# Patient Record
Sex: MALE | Race: WHITE | Employment: UNEMPLOYED | URBAN - METROPOLITAN AREA
[De-identification: names, ages, dates, MRNs, and addresses within clinical notes are randomized per-mention and may not be internally consistent; named-entity substitution may affect disease eponyms.]

---

## 2021-01-02 ENCOUNTER — HOSPITAL ENCOUNTER (INPATIENT)
Facility: CLINIC | Age: 61
LOS: 10 days | Discharge: NURSING, REHAB OR LONG-TERM CARE | End: 2021-01-12
Attending: INTERNAL MEDICINE | Admitting: INTERNAL MEDICINE
Payer: MEDICAID

## 2021-01-02 DIAGNOSIS — M79.10 MUSCLE PAIN: ICD-10-CM

## 2021-01-02 DIAGNOSIS — H81.10 BENIGN PAROXYSMAL POSITIONAL VERTIGO, UNSPECIFIED LATERALITY: ICD-10-CM

## 2021-01-02 DIAGNOSIS — I50.23 HEART FAILURE, SYSTOLIC, ACUTE ON CHRONIC (H): Primary | ICD-10-CM

## 2021-01-02 LAB
ALBUMIN SERPL-MCNC: 2.4 G/DL (ref 3.4–5)
ALP SERPL-CCNC: 116 U/L (ref 40–150)
ALT SERPL W P-5'-P-CCNC: 106 U/L (ref 0–70)
ANION GAP SERPL CALCULATED.3IONS-SCNC: 6 MMOL/L (ref 3–14)
APTT PPP: 29 SEC (ref 22–37)
AST SERPL W P-5'-P-CCNC: 51 U/L (ref 0–45)
BASOPHILS # BLD AUTO: 0 10E9/L (ref 0–0.2)
BASOPHILS NFR BLD AUTO: 0.3 %
BILIRUB SERPL-MCNC: 0.8 MG/DL (ref 0.2–1.3)
BUN SERPL-MCNC: 36 MG/DL (ref 7–30)
CALCIUM SERPL-MCNC: 8.5 MG/DL (ref 8.5–10.1)
CHLORIDE SERPL-SCNC: 101 MMOL/L (ref 94–109)
CK SERPL-CCNC: 144 U/L (ref 30–300)
CO2 SERPL-SCNC: 31 MMOL/L (ref 20–32)
CREAT SERPL-MCNC: 1.47 MG/DL (ref 0.66–1.25)
DIFFERENTIAL METHOD BLD: ABNORMAL
EOSINOPHIL # BLD AUTO: 0.1 10E9/L (ref 0–0.7)
EOSINOPHIL NFR BLD AUTO: 0.6 %
ERYTHROCYTE [DISTWIDTH] IN BLOOD BY AUTOMATED COUNT: 13.8 % (ref 10–15)
GFR SERPL CREATININE-BSD FRML MDRD: 51 ML/MIN/{1.73_M2}
GLUCOSE BLDC GLUCOMTR-MCNC: 148 MG/DL (ref 70–99)
GLUCOSE SERPL-MCNC: 149 MG/DL (ref 70–99)
HCT VFR BLD AUTO: 36.1 % (ref 40–53)
HGB BLD-MCNC: 11.7 G/DL (ref 13.3–17.7)
IMM GRANULOCYTES # BLD: 0.2 10E9/L (ref 0–0.4)
IMM GRANULOCYTES NFR BLD: 1.5 %
INR PPP: 1.2 (ref 0.86–1.14)
LACTATE BLD-SCNC: 1.2 MMOL/L (ref 0.7–2)
LYMPHOCYTES # BLD AUTO: 0.8 10E9/L (ref 0.8–5.3)
LYMPHOCYTES NFR BLD AUTO: 5.9 %
MCH RBC QN AUTO: 31.7 PG (ref 26.5–33)
MCHC RBC AUTO-ENTMCNC: 32.4 G/DL (ref 31.5–36.5)
MCV RBC AUTO: 98 FL (ref 78–100)
MONOCYTES # BLD AUTO: 1.1 10E9/L (ref 0–1.3)
MONOCYTES NFR BLD AUTO: 7.6 %
NEUTROPHILS # BLD AUTO: 12.1 10E9/L (ref 1.6–8.3)
NEUTROPHILS NFR BLD AUTO: 84.1 %
NRBC # BLD AUTO: 0 10*3/UL
NRBC BLD AUTO-RTO: 0 /100
NT-PROBNP SERPL-MCNC: ABNORMAL PG/ML (ref 0–900)
PLATELET # BLD AUTO: 214 10E9/L (ref 150–450)
POTASSIUM SERPL-SCNC: 4.2 MMOL/L (ref 3.4–5.3)
PROT SERPL-MCNC: 6.1 G/DL (ref 6.8–8.8)
RBC # BLD AUTO: 3.69 10E12/L (ref 4.4–5.9)
SODIUM SERPL-SCNC: 138 MMOL/L (ref 133–144)
WBC # BLD AUTO: 14.4 10E9/L (ref 4–11)

## 2021-01-02 PROCEDURE — 93010 ELECTROCARDIOGRAM REPORT: CPT | Performed by: INTERNAL MEDICINE

## 2021-01-02 PROCEDURE — 83605 ASSAY OF LACTIC ACID: CPT | Performed by: STUDENT IN AN ORGANIZED HEALTH CARE EDUCATION/TRAINING PROGRAM

## 2021-01-02 PROCEDURE — 85610 PROTHROMBIN TIME: CPT | Performed by: STUDENT IN AN ORGANIZED HEALTH CARE EDUCATION/TRAINING PROGRAM

## 2021-01-02 PROCEDURE — 83735 ASSAY OF MAGNESIUM: CPT | Performed by: STUDENT IN AN ORGANIZED HEALTH CARE EDUCATION/TRAINING PROGRAM

## 2021-01-02 PROCEDURE — 93005 ELECTROCARDIOGRAM TRACING: CPT

## 2021-01-02 PROCEDURE — 85025 COMPLETE CBC W/AUTO DIFF WBC: CPT | Performed by: STUDENT IN AN ORGANIZED HEALTH CARE EDUCATION/TRAINING PROGRAM

## 2021-01-02 PROCEDURE — 85730 THROMBOPLASTIN TIME PARTIAL: CPT | Performed by: STUDENT IN AN ORGANIZED HEALTH CARE EDUCATION/TRAINING PROGRAM

## 2021-01-02 PROCEDURE — 83880 ASSAY OF NATRIURETIC PEPTIDE: CPT | Performed by: STUDENT IN AN ORGANIZED HEALTH CARE EDUCATION/TRAINING PROGRAM

## 2021-01-02 PROCEDURE — 80053 COMPREHEN METABOLIC PANEL: CPT | Performed by: STUDENT IN AN ORGANIZED HEALTH CARE EDUCATION/TRAINING PROGRAM

## 2021-01-02 PROCEDURE — 36415 COLL VENOUS BLD VENIPUNCTURE: CPT | Performed by: STUDENT IN AN ORGANIZED HEALTH CARE EDUCATION/TRAINING PROGRAM

## 2021-01-02 PROCEDURE — 999N001017 HC STATISTIC GLUCOSE BY METER IP

## 2021-01-02 PROCEDURE — 214N000001 HC R&B CCU UMMC

## 2021-01-02 PROCEDURE — 84145 PROCALCITONIN (PCT): CPT | Performed by: STUDENT IN AN ORGANIZED HEALTH CARE EDUCATION/TRAINING PROGRAM

## 2021-01-02 PROCEDURE — 82550 ASSAY OF CK (CPK): CPT | Performed by: STUDENT IN AN ORGANIZED HEALTH CARE EDUCATION/TRAINING PROGRAM

## 2021-01-02 RX ORDER — FUROSEMIDE 10 MG/ML
40 INJECTION INTRAMUSCULAR; INTRAVENOUS ONCE
Status: COMPLETED | OUTPATIENT
Start: 2021-01-02 | End: 2021-01-03

## 2021-01-02 RX ORDER — POLYETHYLENE GLYCOL 3350 17 G/17G
17 POWDER, FOR SOLUTION ORAL DAILY
Status: DISCONTINUED | OUTPATIENT
Start: 2021-01-03 | End: 2021-01-12 | Stop reason: HOSPADM

## 2021-01-02 RX ORDER — ACETAMINOPHEN 650 MG/1
650 SUPPOSITORY RECTAL EVERY 4 HOURS PRN
Status: DISCONTINUED | OUTPATIENT
Start: 2021-01-02 | End: 2021-01-12 | Stop reason: HOSPADM

## 2021-01-02 RX ORDER — ACETAMINOPHEN 325 MG/1
650 TABLET ORAL EVERY 4 HOURS PRN
Status: DISCONTINUED | OUTPATIENT
Start: 2021-01-02 | End: 2021-01-12 | Stop reason: HOSPADM

## 2021-01-02 RX ORDER — LANSOPRAZOLE 30 MG/1
30 TABLET, ORALLY DISINTEGRATING, DELAYED RELEASE ORAL 2 TIMES DAILY
Status: DISCONTINUED | OUTPATIENT
Start: 2021-01-03 | End: 2021-01-12 | Stop reason: HOSPADM

## 2021-01-02 RX ORDER — DEXTROSE MONOHYDRATE 25 G/50ML
25-50 INJECTION, SOLUTION INTRAVENOUS
Status: DISCONTINUED | OUTPATIENT
Start: 2021-01-02 | End: 2021-01-12 | Stop reason: HOSPADM

## 2021-01-02 RX ORDER — ATORVASTATIN CALCIUM 40 MG/1
40 TABLET, FILM COATED ORAL EVERY EVENING
Status: DISCONTINUED | OUTPATIENT
Start: 2021-01-03 | End: 2021-01-03

## 2021-01-02 RX ORDER — AMOXICILLIN 250 MG
1 CAPSULE ORAL 2 TIMES DAILY
Status: DISCONTINUED | OUTPATIENT
Start: 2021-01-02 | End: 2021-01-12 | Stop reason: HOSPADM

## 2021-01-02 RX ORDER — AMOXICILLIN 250 MG
2 CAPSULE ORAL 2 TIMES DAILY
Status: DISCONTINUED | OUTPATIENT
Start: 2021-01-02 | End: 2021-01-12 | Stop reason: HOSPADM

## 2021-01-02 RX ORDER — NICOTINE POLACRILEX 4 MG
15-30 LOZENGE BUCCAL
Status: DISCONTINUED | OUTPATIENT
Start: 2021-01-02 | End: 2021-01-12 | Stop reason: HOSPADM

## 2021-01-02 RX ORDER — ASPIRIN 81 MG/1
324 TABLET, CHEWABLE ORAL ONCE
Status: DISCONTINUED | OUTPATIENT
Start: 2021-01-02 | End: 2021-01-02

## 2021-01-02 RX ORDER — FINASTERIDE 5 MG/1
5 TABLET, FILM COATED ORAL DAILY
Status: DISCONTINUED | OUTPATIENT
Start: 2021-01-03 | End: 2021-01-02

## 2021-01-02 RX ORDER — LIDOCAINE 40 MG/G
CREAM TOPICAL
Status: DISCONTINUED | OUTPATIENT
Start: 2021-01-02 | End: 2021-01-12 | Stop reason: HOSPADM

## 2021-01-02 RX ORDER — ONDANSETRON 2 MG/ML
4 INJECTION INTRAMUSCULAR; INTRAVENOUS EVERY 6 HOURS PRN
Status: DISCONTINUED | OUTPATIENT
Start: 2021-01-02 | End: 2021-01-12 | Stop reason: HOSPADM

## 2021-01-02 RX ORDER — ASPIRIN 81 MG/1
81 TABLET, CHEWABLE ORAL DAILY
Status: DISCONTINUED | OUTPATIENT
Start: 2021-01-03 | End: 2021-01-12 | Stop reason: HOSPADM

## 2021-01-02 RX ORDER — ONDANSETRON 4 MG/1
4 TABLET, ORALLY DISINTEGRATING ORAL EVERY 6 HOURS PRN
Status: DISCONTINUED | OUTPATIENT
Start: 2021-01-02 | End: 2021-01-12 | Stop reason: HOSPADM

## 2021-01-02 ASSESSMENT — ACTIVITIES OF DAILY LIVING (ADL)
NUMBER_OF_TIMES_PATIENT_HAS_FALLEN_WITHIN_LAST_SIX_MONTHS: 2
WALKING_OR_CLIMBING_STAIRS_DIFFICULTY: YES
DIFFICULTY_COMMUNICATING: NO
DOING_ERRANDS_INDEPENDENTLY_DIFFICULTY: NO
TOILETING_ISSUES: YES
WALKING_OR_CLIMBING_STAIRS: AMBULATION DIFFICULTY, REQUIRES EQUIPMENT
FALL_HISTORY_WITHIN_LAST_SIX_MONTHS: YES
DIFFICULTY_EATING/SWALLOWING: NO
TOILETING_ASSISTANCE: TOILETING DIFFICULTY, REQUIRES EQUIPMENT
WHICH_OF_THE_ABOVE_FUNCTIONAL_RISKS_HAD_A_RECENT_ONSET_OR_CHANGE?: AMBULATION;TRANSFERRING;TOILETING;BATHING;DRESSING;FALL HISTORY
CONCENTRATING,_REMEMBERING_OR_MAKING_DECISIONS_DIFFICULTY: NO
EQUIPMENT_CURRENTLY_USED_AT_HOME: WALKER, ROLLING
DRESSING/BATHING_DIFFICULTY: YES

## 2021-01-02 NOTE — LETTER
Health Information Management Services               Recipient:  [facility]        Sender:  Lucy Galeana, [facility] [phone]        Date: January 12, 2021  Patient Name:  Willy Harrell  Routing Message:    Discharge Orders        The documents accompanying this notice contain confidential information belonging to the sender.  This information is intended only for the use of the individual or entity named above.  The authorized recipient of this information is prohibited from disclosing this information to any other party and is required to destroy the information after its stated need has been fulfilled, unless otherwise required by state law.      If you are not the intended recipient, you are hereby notified that any disclosure, copy, distribution or action taken in reliance on the contents of these documents is strictly prohibited.  If you have received this document in error, please return it by fax to [phone] with a note on the cover sheet explaining why you are returning it (e.g. not your patient, not your provider, etc.).  If you need further assistance, please call [facility] Centralized Transcription at [phone].  Documents may also be returned by mail to "[employer]", , [facility] [address], [place], [place] [address].

## 2021-01-02 NOTE — LETTER
Health Information Management Services               Recipient:    Admissions      Sender:    Rupa VALENCIA [facility] [phone]      Date: January 8, 2021  Patient Name:  Willy Harrell  Routing Message:  Referral for TCU. Thank you!          The documents accompanying this notice contain confidential information belonging to the sender.  This information is intended only for the use of the individual or entity named above.  The authorized recipient of this information is prohibited from disclosing this information to any other party and is required to destroy the information after its stated need has been fulfilled, unless otherwise required by state law.      If you are not the intended recipient, you are hereby notified that any disclosure, copy, distribution or action taken in reliance on the contents of these documents is strictly prohibited.  If you have received this document in error, please return it by fax to [phone] with a note on the cover sheet explaining why you are returning it (e.g. not your patient, not your provider, etc.).  If you need further assistance, please call [facility] Centralized Transcription at [phone].  Documents may also be returned by mail to [employer], , [address], [place], [place] [address].

## 2021-01-02 NOTE — LETTER
Health Information Management Services               Recipient:  Short term rehab admissions        Sender:  ANNIE Pittman, LICSW  6C   [facility]  Phone [phone]  RN Station Ph [phone]        Date: January 7, 2021  Patient Name:  Willy Harrell  Routing Message:    Please consider for rehab placement, likely ready for d/c Mon. Thanks!        The documents accompanying this notice contain confidential information belonging to the sender.  This information is intended only for the use of the individual or entity named above.  The authorized recipient of this information is prohibited from disclosing this information to any other party and is required to destroy the information after its stated need has been fulfilled, unless otherwise required by state law.      If you are not the intended recipient, you are hereby notified that any disclosure, copy, distribution or action taken in reliance on the contents of these documents is strictly prohibited.  If you have received this document in error, please return it by fax to [phone] with a note on the cover sheet explaining why you are returning it (e.g. not your patient, not your provider, etc.).  If you need further assistance, please call [facility] Centralized Transcription at [phone].  Documents may also be returned by mail to [employer], , [facility] [address], [place], [place] [address].

## 2021-01-02 NOTE — LETTER
Health Information Management Services               Recipient:    Admissions       Sender:    Rupa VALENCIA [facility] [phone]      Date: January 8, 2021  Patient Name:  Willy Harrell  Routing Message:  Referral for TCU, thank you!          The documents accompanying this notice contain confidential information belonging to the sender.  This information is intended only for the use of the individual or entity named above.  The authorized recipient of this information is prohibited from disclosing this information to any other party and is required to destroy the information after its stated need has been fulfilled, unless otherwise required by state law.      If you are not the intended recipient, you are hereby notified that any disclosure, copy, distribution or action taken in reliance on the contents of these documents is strictly prohibited.  If you have received this document in error, please return it by fax to [phone] with a note on the cover sheet explaining why you are returning it (e.g. not your patient, not your provider, etc.).  If you need further assistance, please call [facility] [place] Centralized Transcription at [phone].  Documents may also be returned by mail to [employer], , [address], [place], [place] [address].

## 2021-01-02 NOTE — LETTER
Transition Communication Hand-off for Care Transitions to Next Level of Care Provider    Name: Willy Harrell  : 1960  MRN #: [id number]  Primary Care Provider: Anita Gusman     Primary Clinic: [facility] [address]  [facility] [address]     Reason for Hospitalization:  post-STEMI, systolic heart failure  Heart failure, systolic, acute on chronic (H)  Admit Date/Time: 2021  8:58 PM  Discharge Date: 21  Payor Source: Payor: MEDICAID [place] / Plan: [facility] [place] PLAN [place] PMAP / Product Type: *No Product type* /          Reason for Communication Hand-off Referral: Other Admitting to TCU    Discharge Plan:    Care Management Discharge Note    Discharge Date: 21  Expected Time of Departure: 1500 via [employer] (Ph: [phone]) - stretcher d/t O2 and weakness/deconditioning    Discharge Disposition: Transitional Care: [facility]  [address] [place] [address]  Ph: [phone], F: [phone]    Private pay costs discussed: transportation costs    PAS Confirmation Code:  N/A - Not needed for [place] TCU  Patient/family educated on Medicare website which has current facility and service quality ratings: yes    Education Provided on the Discharge Plan:  yes  Persons Notified of Discharge Plans: Patient, Pt's wife (Jaky), Cards 2 Team, [facility], 6C Nursing Staff  Patient/Family in Agreement with the Plan: yes     Concern for non-adherence with plan of care:   Y/N N  Discharge Needs Assessment:  Needs      Most Recent Value   Anticipated Changes Related to Illness  none   Equipment Currently Used at Home  walker, rolling [readily available but does not currently use ]   # of Referrals Placed by CTS  Post Acute Facilities          Already enrolled in Tele-monitoring program and name of program:  N/A  Follow-up specialty is recommended: Yes    Follow-up plan:    Future Appointments   Date Time Provider Department Center   2021   7:00 [facility]       Any outstanding tests or procedures:        Referrals     Future Labs/Procedures    Occupational Therapy Adult Consult     Comments:    Evaluate and treat as clinically indicated.    Reason:  Deconditioning, prolonged hospitalization    Physical Therapy Adult Consult     Comments:    Evaluate and treat as clinically indicated.    Reason:  Deconditioning prolonged hospitalization           ANNIE Chase, LICSW  6B Intermediate Care Unit   [facility]  Phone: [phone]  Pager: [phone]

## 2021-01-03 ENCOUNTER — APPOINTMENT (OUTPATIENT)
Dept: GENERAL RADIOLOGY | Facility: CLINIC | Age: 61
End: 2021-01-03
Attending: INTERNAL MEDICINE
Payer: MEDICAID

## 2021-01-03 ENCOUNTER — APPOINTMENT (OUTPATIENT)
Dept: CT IMAGING | Facility: CLINIC | Age: 61
End: 2021-01-03
Attending: INTERNAL MEDICINE
Payer: MEDICAID

## 2021-01-03 ENCOUNTER — APPOINTMENT (OUTPATIENT)
Dept: CARDIOLOGY | Facility: CLINIC | Age: 61
End: 2021-01-03
Attending: INTERNAL MEDICINE
Payer: MEDICAID

## 2021-01-03 ENCOUNTER — APPOINTMENT (OUTPATIENT)
Dept: ULTRASOUND IMAGING | Facility: CLINIC | Age: 61
End: 2021-01-03
Attending: INTERNAL MEDICINE
Payer: MEDICAID

## 2021-01-03 LAB
ALBUMIN SERPL-MCNC: 2.4 G/DL (ref 3.4–5)
ALP SERPL-CCNC: 115 U/L (ref 40–150)
ALT SERPL W P-5'-P-CCNC: 92 U/L (ref 0–70)
ANION GAP SERPL CALCULATED.3IONS-SCNC: 6 MMOL/L (ref 3–14)
ANION GAP SERPL CALCULATED.3IONS-SCNC: 7 MMOL/L (ref 3–14)
APTT PPP: 26 SEC (ref 22–37)
AST SERPL W P-5'-P-CCNC: 51 U/L (ref 0–45)
BILIRUB DIRECT SERPL-MCNC: 0.2 MG/DL (ref 0–0.2)
BILIRUB SERPL-MCNC: 0.8 MG/DL (ref 0.2–1.3)
BUN SERPL-MCNC: 35 MG/DL (ref 7–30)
BUN SERPL-MCNC: 37 MG/DL (ref 7–30)
CALCIUM SERPL-MCNC: 8.7 MG/DL (ref 8.5–10.1)
CALCIUM SERPL-MCNC: 8.9 MG/DL (ref 8.5–10.1)
CHLORIDE SERPL-SCNC: 102 MMOL/L (ref 94–109)
CHLORIDE SERPL-SCNC: 103 MMOL/L (ref 94–109)
CO2 SERPL-SCNC: 29 MMOL/L (ref 20–32)
CO2 SERPL-SCNC: 32 MMOL/L (ref 20–32)
CREAT SERPL-MCNC: 1.52 MG/DL (ref 0.66–1.25)
CREAT SERPL-MCNC: 1.55 MG/DL (ref 0.66–1.25)
ERYTHROCYTE [DISTWIDTH] IN BLOOD BY AUTOMATED COUNT: 13.9 % (ref 10–15)
GFR SERPL CREATININE-BSD FRML MDRD: 48 ML/MIN/{1.73_M2}
GFR SERPL CREATININE-BSD FRML MDRD: 49 ML/MIN/{1.73_M2}
GLUCOSE BLDC GLUCOMTR-MCNC: 145 MG/DL (ref 70–99)
GLUCOSE BLDC GLUCOMTR-MCNC: 166 MG/DL (ref 70–99)
GLUCOSE SERPL-MCNC: 145 MG/DL (ref 70–99)
GLUCOSE SERPL-MCNC: 156 MG/DL (ref 70–99)
HBA1C MFR BLD: 6.1 % (ref 0–5.6)
HCT VFR BLD AUTO: 35.3 % (ref 40–53)
HGB BLD-MCNC: 11.5 G/DL (ref 13.3–17.7)
LABORATORY COMMENT REPORT: NORMAL
MAGNESIUM SERPL-MCNC: 2.3 MG/DL (ref 1.6–2.3)
MCH RBC QN AUTO: 32 PG (ref 26.5–33)
MCHC RBC AUTO-ENTMCNC: 32.6 G/DL (ref 31.5–36.5)
MCV RBC AUTO: 98 FL (ref 78–100)
PLATELET # BLD AUTO: 201 10E9/L (ref 150–450)
POTASSIUM SERPL-SCNC: 4.1 MMOL/L (ref 3.4–5.3)
POTASSIUM SERPL-SCNC: 4.1 MMOL/L (ref 3.4–5.3)
PROCALCITONIN SERPL-MCNC: 0.05 NG/ML
PROT SERPL-MCNC: 5.8 G/DL (ref 6.8–8.8)
RBC # BLD AUTO: 3.59 10E12/L (ref 4.4–5.9)
SARS-COV-2 RNA SPEC QL NAA+PROBE: NEGATIVE
SODIUM SERPL-SCNC: 139 MMOL/L (ref 133–144)
SODIUM SERPL-SCNC: 140 MMOL/L (ref 133–144)
SPECIMEN SOURCE: NORMAL
WBC # BLD AUTO: 14.3 10E9/L (ref 4–11)

## 2021-01-03 PROCEDURE — 999N000208 ECHOCARDIOGRAM LIMITED

## 2021-01-03 PROCEDURE — 250N000011 HC RX IP 250 OP 636: Performed by: INTERNAL MEDICINE

## 2021-01-03 PROCEDURE — 70450 CT HEAD/BRAIN W/O DYE: CPT

## 2021-01-03 PROCEDURE — 93010 ELECTROCARDIOGRAM REPORT: CPT | Performed by: INTERNAL MEDICINE

## 2021-01-03 PROCEDURE — U0003 INFECTIOUS AGENT DETECTION BY NUCLEIC ACID (DNA OR RNA); SEVERE ACUTE RESPIRATORY SYNDROME CORONAVIRUS 2 (SARS-COV-2) (CORONAVIRUS DISEASE [COVID-19]), AMPLIFIED PROBE TECHNIQUE, MAKING USE OF HIGH THROUGHPUT TECHNOLOGIES AS DESCRIBED BY CMS-2020-01-R: HCPCS | Performed by: STUDENT IN AN ORGANIZED HEALTH CARE EDUCATION/TRAINING PROGRAM

## 2021-01-03 PROCEDURE — U0005 INFEC AGEN DETEC AMPLI PROBE: HCPCS | Performed by: STUDENT IN AN ORGANIZED HEALTH CARE EDUCATION/TRAINING PROGRAM

## 2021-01-03 PROCEDURE — 250N000011 HC RX IP 250 OP 636: Performed by: STUDENT IN AN ORGANIZED HEALTH CARE EDUCATION/TRAINING PROGRAM

## 2021-01-03 PROCEDURE — 93308 TTE F-UP OR LMTD: CPT | Mod: 26 | Performed by: INTERNAL MEDICINE

## 2021-01-03 PROCEDURE — 999N001017 HC STATISTIC GLUCOSE BY METER IP

## 2021-01-03 PROCEDURE — 93925 LOWER EXTREMITY STUDY: CPT | Mod: 26 | Performed by: RADIOLOGY

## 2021-01-03 PROCEDURE — 93321 DOPPLER ECHO F-UP/LMTD STD: CPT | Mod: 26 | Performed by: INTERNAL MEDICINE

## 2021-01-03 PROCEDURE — 85027 COMPLETE CBC AUTOMATED: CPT | Performed by: STUDENT IN AN ORGANIZED HEALTH CARE EDUCATION/TRAINING PROGRAM

## 2021-01-03 PROCEDURE — 36415 COLL VENOUS BLD VENIPUNCTURE: CPT | Performed by: STUDENT IN AN ORGANIZED HEALTH CARE EDUCATION/TRAINING PROGRAM

## 2021-01-03 PROCEDURE — 93325 DOPPLER ECHO COLOR FLOW MAPG: CPT | Mod: 26 | Performed by: INTERNAL MEDICINE

## 2021-01-03 PROCEDURE — 80076 HEPATIC FUNCTION PANEL: CPT | Performed by: INTERNAL MEDICINE

## 2021-01-03 PROCEDURE — 255N000002 HC RX 255 OP 636: Performed by: INTERNAL MEDICINE

## 2021-01-03 PROCEDURE — 71045 X-RAY EXAM CHEST 1 VIEW: CPT | Mod: 26 | Performed by: RADIOLOGY

## 2021-01-03 PROCEDURE — 99221 1ST HOSP IP/OBS SF/LOW 40: CPT | Performed by: PSYCHIATRY & NEUROLOGY

## 2021-01-03 PROCEDURE — 85730 THROMBOPLASTIN TIME PARTIAL: CPT | Performed by: STUDENT IN AN ORGANIZED HEALTH CARE EDUCATION/TRAINING PROGRAM

## 2021-01-03 PROCEDURE — 93925 LOWER EXTREMITY STUDY: CPT

## 2021-01-03 PROCEDURE — 83036 HEMOGLOBIN GLYCOSYLATED A1C: CPT | Performed by: INTERNAL MEDICINE

## 2021-01-03 PROCEDURE — 93005 ELECTROCARDIOGRAM TRACING: CPT

## 2021-01-03 PROCEDURE — 36415 COLL VENOUS BLD VENIPUNCTURE: CPT | Performed by: INTERNAL MEDICINE

## 2021-01-03 PROCEDURE — 71045 X-RAY EXAM CHEST 1 VIEW: CPT

## 2021-01-03 PROCEDURE — 250N000013 HC RX MED GY IP 250 OP 250 PS 637: Performed by: STUDENT IN AN ORGANIZED HEALTH CARE EDUCATION/TRAINING PROGRAM

## 2021-01-03 PROCEDURE — 214N000001 HC R&B CCU UMMC

## 2021-01-03 PROCEDURE — 80048 BASIC METABOLIC PNL TOTAL CA: CPT | Performed by: INTERNAL MEDICINE

## 2021-01-03 PROCEDURE — 99223 1ST HOSP IP/OBS HIGH 75: CPT | Mod: 25 | Performed by: INTERNAL MEDICINE

## 2021-01-03 PROCEDURE — 70450 CT HEAD/BRAIN W/O DYE: CPT | Mod: 26 | Performed by: RADIOLOGY

## 2021-01-03 RX ORDER — FUROSEMIDE 10 MG/ML
40 INJECTION INTRAMUSCULAR; INTRAVENOUS 2 TIMES DAILY
Status: DISCONTINUED | OUTPATIENT
Start: 2021-01-03 | End: 2021-01-04

## 2021-01-03 RX ORDER — ASPIRIN 81 MG/1
81 TABLET, CHEWABLE ORAL DAILY
COMMUNITY

## 2021-01-03 RX ORDER — ISOSORBIDE DINITRATE 5 MG/1
5 TABLET ORAL 3 TIMES DAILY
Status: ON HOLD | COMMUNITY
End: 2021-01-12

## 2021-01-03 RX ORDER — FINASTERIDE 5 MG/1
5 TABLET, FILM COATED ORAL DAILY
COMMUNITY

## 2021-01-03 RX ORDER — LISINOPRIL 10 MG/1
5 TABLET ORAL DAILY
COMMUNITY

## 2021-01-03 RX ORDER — MECLIZINE HCL 12.5 MG 12.5 MG/1
12.5 TABLET ORAL 3 TIMES DAILY PRN
Status: DISCONTINUED | OUTPATIENT
Start: 2021-01-03 | End: 2021-01-12 | Stop reason: HOSPADM

## 2021-01-03 RX ORDER — HEPARIN SODIUM 10000 [USP'U]/100ML
0-5000 INJECTION, SOLUTION INTRAVENOUS CONTINUOUS
Status: DISCONTINUED | OUTPATIENT
Start: 2021-01-03 | End: 2021-01-03 | Stop reason: CLARIF

## 2021-01-03 RX ORDER — CITALOPRAM HYDROBROMIDE 20 MG/1
20 TABLET ORAL DAILY
COMMUNITY

## 2021-01-03 RX ORDER — ATORVASTATIN CALCIUM 40 MG/1
40 TABLET, FILM COATED ORAL EVERY EVENING
Status: DISCONTINUED | OUTPATIENT
Start: 2021-01-03 | End: 2021-01-12 | Stop reason: HOSPADM

## 2021-01-03 RX ORDER — METOPROLOL TARTRATE 25 MG/1
12.5 TABLET, FILM COATED ORAL 2 TIMES DAILY
Status: ON HOLD | COMMUNITY
End: 2021-01-12

## 2021-01-03 RX ORDER — FUROSEMIDE 10 MG/ML
60 INJECTION INTRAMUSCULAR; INTRAVENOUS EVERY 12 HOURS
Status: DISCONTINUED | OUTPATIENT
Start: 2021-01-03 | End: 2021-01-03

## 2021-01-03 RX ORDER — ATORVASTATIN CALCIUM 40 MG/1
40 TABLET, FILM COATED ORAL EVERY EVENING
Status: DISCONTINUED | OUTPATIENT
Start: 2021-01-04 | End: 2021-01-03

## 2021-01-03 RX ORDER — CITALOPRAM HYDROBROMIDE 20 MG/1
20 TABLET ORAL DAILY
Status: DISCONTINUED | OUTPATIENT
Start: 2021-01-03 | End: 2021-01-12 | Stop reason: HOSPADM

## 2021-01-03 RX ORDER — ATORVASTATIN CALCIUM 20 MG/1
40 TABLET, FILM COATED ORAL EVERY EVENING
COMMUNITY

## 2021-01-03 RX ORDER — LANSOPRAZOLE 30 MG/1
30 TABLET, ORALLY DISINTEGRATING, DELAYED RELEASE ORAL 2 TIMES DAILY
COMMUNITY

## 2021-01-03 RX ADMIN — ASPIRIN 81 MG CHEWABLE TABLET 81 MG: 81 TABLET CHEWABLE at 08:57

## 2021-01-03 RX ADMIN — TICAGRELOR 90 MG: 90 TABLET ORAL at 21:00

## 2021-01-03 RX ADMIN — FUROSEMIDE 40 MG: 10 INJECTION, SOLUTION INTRAVENOUS at 21:00

## 2021-01-03 RX ADMIN — TICAGRELOR 90 MG: 90 TABLET ORAL at 08:57

## 2021-01-03 RX ADMIN — ATORVASTATIN CALCIUM 40 MG: 40 TABLET, FILM COATED ORAL at 21:00

## 2021-01-03 RX ADMIN — LANSOPRAZOLE 30 MG: 30 TABLET, ORALLY DISINTEGRATING, DELAYED RELEASE ORAL at 21:00

## 2021-01-03 RX ADMIN — FUROSEMIDE 40 MG: 10 INJECTION, SOLUTION INTRAVENOUS at 00:24

## 2021-01-03 RX ADMIN — FUROSEMIDE 40 MG: 10 INJECTION, SOLUTION INTRAVENOUS at 10:02

## 2021-01-03 RX ADMIN — HUMAN ALBUMIN MICROSPHERES AND PERFLUTREN 6 ML: 10; .22 INJECTION, SOLUTION INTRAVENOUS at 10:59

## 2021-01-03 RX ADMIN — ONDANSETRON 4 MG: 2 INJECTION INTRAMUSCULAR; INTRAVENOUS at 17:53

## 2021-01-03 RX ADMIN — ACETAMINOPHEN 650 MG: 325 TABLET, FILM COATED ORAL at 00:31

## 2021-01-03 RX ADMIN — LANSOPRAZOLE 30 MG: 30 TABLET, ORALLY DISINTEGRATING, DELAYED RELEASE ORAL at 09:01

## 2021-01-03 RX ADMIN — CITALOPRAM HYDROBROMIDE 20 MG: 20 TABLET ORAL at 08:57

## 2021-01-03 ASSESSMENT — ACTIVITIES OF DAILY LIVING (ADL)
ADLS_ACUITY_SCORE: 18

## 2021-01-03 ASSESSMENT — MIFFLIN-ST. JEOR
SCORE: 1650.4
SCORE: 1654.94

## 2021-01-03 NOTE — PROGRESS NOTES
Vascular Update:    Please see the full consult note by the covering general surgery resident.  The patient is transferred here for cardiac reasons and has significant cardiac issues.  Vascular consulted because his right LE feels cold to touch.  He has no pain in either foot.  Dopplers audible in PT and DP bilaterally.  He denies claudication, no calf pain with ambulating and no rest pain, no pain at night in the forefoot.  He says his legs have always been cool to the touch.  This is not a new finding.  On exam the feet are mildly cool to the touch.  They are not cyanotic in appearance.  We ordered a bilateral arterial duplex.  This does not show any obvious stenosis.  He has triphasic flow down into the PT and AT on the right foot and biphasic flow down to the ankle on the left foot.  He has excellent inflow with great triphasic waveforms of both CFA, SFA, and profunda bilaterally.  Vascular will sign off.  No plans for any vascular intervention.  He is asymptomatic, he has flow to both feet and he is here for cardiac reasons.    Plan of care directed by Dr. Macdonald, the Attending Vascular Surgeon.    Please contact the vascular surgery team with any questions or concerns.    Ortiz Ledesma MD  Vascular Surgery Fellow

## 2021-01-03 NOTE — PHARMACY-ADMISSION MEDICATION HISTORY
Admission medication history interview status for the 1/2/2021 admission is complete. See Epic admission navigator for allergy information, pharmacy, prior to admission medications and immunization status.     Medication history interview sources: pharmacist med history note from [facility] (12/22), OSH MAR accessed via [employer]    Changes made to PTA medication list (reason)  Added: all medications listed  Deleted: n/a  Changed: n/a    Additional medication history information (including reliability of information, actions taken by pharmacist):   - medications added after review of inpatient MAR from [facility]  - per the pharmacist med history documented on 12/22, only medications prior to admission were Lipitor (20 mg, increased to 40 mg at OSH), lisinopril, and diphenhydramine prn  - patient also received PRN acetaminophen, benzonatate, furosemide IV pushes  (40 and 60 mg doses), SQH, insulin lispro, lorazepam prn, melatonin, Miralax, senna/docusate at OSH. I did not add these to his outpatient medication list    Prior to Admission medications    Medication Sig Last Dose Taking? Auth Provider   aspirin (ASA) 81 MG chewable tablet Take 81 mg by mouth daily 1/2/2021 at 0900 Yes Unknown, Entered By History   citalopram (CELEXA) 20 MG tablet Take 20 mg by mouth daily 1/2/2021 at 0900 Yes Unknown, Entered By History   finasteride (PROSCAR) 5 MG tablet Take 5 mg by mouth daily 1/2/2021 at 0900 Yes Unknown, Entered By History   isosorbide dinitrate (ISORDIL) 5 MG tablet Take 5 mg by mouth 3 times daily 1/1/2021 at 1700 Yes Unknown, Entered By History   LANsoprazole (PREVACID SOLUTAB) 30 MG ODT Take 30 mg by mouth 2 times daily 1/2/2021 at 2000 Yes Unknown, Entered By History   metoprolol tartrate (LOPRESSOR) 25 MG tablet Take 12.5 mg by mouth 2 times daily 1/2/2021 at 2000 Yes Unknown, Entered By History   ticagrelor (BRILINTA) 90 MG tablet Take 90 mg by mouth 2 times daily 1/2/2021 at 2000 Yes Unknown,  Entered By History   atorvastatin (LIPITOR) 20 MG tablet Take 40 mg by mouth every evening  1/2/2021 at 2000  Unknown, Entered By History   diphenhydrAMINE (BENADRYL) 25 MG tablet Take 25 mg by mouth every 6 hours as needed for itching   Unknown, Entered By History   lisinopril (ZESTRIL) 10 MG tablet Take 5 mg by mouth daily   Unknown, Entered By History     Medication history completed by: Ana Paula Llanos, PharmD

## 2021-01-03 NOTE — PLAN OF CARE
6C Cancel, pt heading to CT (transport in room).  Pt declines PT eval this PM, and indicates last few times he tried standing with EZ stand that he almost collapsed in the EZ stand - says he's too weak for it.  Pt does agree to PT eval and trial of moveo a different day.

## 2021-01-03 NOTE — PROGRESS NOTES
Admission~ 2100  Diagnosis:HF work up   Admitted from:[facility]   Via: Ambulance  Accompanied by: EMT  Belongings: No belongings here with pt  Admission Profile: done  Teaching: orientation to unit, call don't fall, use of console, meal times, visiting hours, when to call for the RN (angina/sob/dizziness, etc.), and enforced importance of safety   Access: Pt came with PIVx2 SK  Telemetry: Placed on patient  Height/Weight:  Unable to take pt's we at this time since he  Is not able to stand on scale. Awating a room with a ceiling lift to zero bed.   2RN full skin assessment was done with DU Benavidez.   Pt came with Zafar cath due to hx of retention.

## 2021-01-03 NOTE — PROGRESS NOTES
[facility]     Stroke Consult Note    Reason for Consult: Left lower extremity weakness, concern for infarcts see on MRI at OSH    Chief Complaint: Advance heart failure therapy evaluation    HPI  Willy Harrell is a 60 year old male with past medical history of type 2 diabetes mellitus, hypertension, and osteomyelitis who was originally presented to [facility] on 12/22/20 with cardiogenic shock and anterior STEMI but was transferred to [facility] for evaluation of advance heart failure therapy.  He has had a prolonged course complicated by shock, hypoxic respiratory failure, rhabdomyolysis, ZAIN and severe sepsis.  Neurology was consulted for left lower extremity weakness.    Reportedly neurology was consulted on 12/25 (unable to find the note).  The overall story is somewhat unclear.  There are notes that the patient's wife reported that the patient has a history of osteomyelitis in summer 2019 in his left lower extremity and he has had chronic weakness in this leg since then.  There was also some concern for psychogenic etiology per the notes, especially in the setting of his wife reporting longstanding failure to thrive and depression.  Per the primary team notes the patient was being considered for ischemic stroke but thought to be less likely with his preserved sensation, concern for diabetic plexopathy due to longstanding diabetes, and spinal cord ischemia was considered.  There was also concern for contributions from his rhabdomyolysis and also critical illness myoneuronopathy.    In terms of his dizziness and nausea, per the OSH chart this is his baseline and not an acute issue.    TPA Treatment   Not given due to established infarct on imaging and unclear or unfavorable risk-benefit profile for extended window thrombolysis beyond the conventional 4.5 hour time window.    Endovascular Treatment  Not initiated due to absence of proximal vessel  occlusion    Impression  Mr. Harrell is a 60 year old male with recent admission for cardiogenic shock complicated by septic shock, respiratory failure, rhabdomyolysis, and STEMI who was transferred for escalation of care by cardiology.  Neurology consulted for left lower extremity weakness.  Overall it is difficult to assess the etiology of his weakness as it seems to be bilateral lower extremity weakness on my exam but mainly in the hips and knees.  His arm strength appears preserved and no other focal findings noted on exam.  He has had extensive neural axis imaging at [facility] however despite asking imaging to be forwarded only parts of the MRI brain is visible and not all of the spine imaging.  Regardless his MRI brain was read as multifocal infarctions, some of which could be contributing based on the read however without being able to see the imaging myself it is difficult to assess.  He was also noted to have an abnormality at T4 as well.      Suspect his lower extremity weakness is multifactorial.  He certainly could have contribution from his infarctions however his preserved sensation and not consistent hemiparesis is atypical.  The bilateral leg weakness is also atypical for a cerebral infarct.  The T4 lesion is reportedly in the ventral aspect of the cord which can cause lower extremity weakness, and bowel/bladder dysfunction but would expect more of a flaccid paralysis with some sensory involvement and he is able to move his right leg better than his left, and distally strength is at least 3/5.  No hyperreflexia noted in his lower extremities either.  The proximal weakness could be consistent with a myopathy and he did have rhabdo and there is likely a component of critical illness myopathy.      1. Left lower extremity weakness   2. Multifocal infarctions  3. Spinal cord abnormality - reportedly T2 hyperintense signal at T4 level in ventral aspec    Stroke work up:  - MRI Brain x 2 completed at OSH  -  "TTE completed at OSH and CHRISTOPHE pending here  - LDL 38  - HgbA1c 6.2%    Additional neurologic work up:  - MRI of C, T, and L spine three times - results as per below in note  - CT head completed as well    Recommendations  From a stroke standpoint he is essentially optimized from a medication standpoint.  He is on dual antiplatelet and atorvastatin 40, additionally from a stroke standpoint would either reduce or stop his atorvastatin however will defer to cardiology as it may be necessary from their standpoint.  Based on his CT and the description of the strokes from the OSH there is no contraindication.  We will attempt to get the imaging from [facility] again if possible.  Would not obtain EMG at this point as it will likely not change present management and difficult to assess inpatient.  There was no enhancement noted on his spine imaging and they did not comment on nerve root enhancement making an inflammatory etiology less likely, additionally adding steroids would potentially worsen an underlying myopathy.      - CT Angiography Head and Neck to complete stroke work up  - PT/OT, stroke education  - We will attempt to get the imaging sent from [facility] again  - Will defer antiplatelet versus anticoagulation or a combination of to cardiology     Recommendations were discussed with primary team on 1/3/21.    Patient Follow-up    - in 4-6 weeks with local neurologist for further evaluation      Thank you for this consult. We will follow up the CTA but if unremarkable no additional work up indicated.  Please call [phone] with further questions.    Jaky Oliva MD   Neurocritical Care  To page me or covering stroke neurology team member, click here: [facility]   Choose \"On Call\" tab at top, then search dropdown box for \"Neurology Adult\", select location, press Enter, then look for stroke/neuro ICU/telestroke.    ______________________________________________________    Past Medical History   No past medical history on " file.  Past Surgical History   No past surgical history on file.  Medications   Home Meds  Prior to Admission medications    Not on File     Scheduled Meds    aspirin  81 mg Oral Daily     atorvastatin  40 mg Oral QPM     citalopram  20 mg Oral Daily     furosemide  40 mg Intravenous BID     insulin aspart  1-7 Units Subcutaneous TID AC     insulin aspart  1-5 Units Subcutaneous At Bedtime     LANsoprazole  30 mg Oral BID     polyethylene glycol  17 g Oral Daily     senna-docusate  1 tablet Oral BID    Or     senna-docusate  2 tablet Oral BID     sodium chloride (PF)  3 mL Intracatheter Q8H     ticagrelor  90 mg Oral BID     Infusion Meds    [Held by provider] heparin       - MEDICATION INSTRUCTIONS -       PRN Meds  acetaminophen, acetaminophen, glucose **OR** dextrose **OR** glucagon, lidocaine 4%, lidocaine (buffered or not buffered), meclizine, - MEDICATION INSTRUCTIONS -, ondansetron **OR** ondansetron, sodium chloride (PF)    Allergies   No Known Allergies  Family History   No family history on file.  Social History   Social History     Tobacco Use     Smoking status: Not on file   Substance Use Topics     Alcohol use: Not on file     Drug use: Not on file       Review of Systems   The 10 point Review of Systems is negative other than noted in the HPI or here.      PHYSICAL EXAMINATION  Temp:  [98.1  F (36.7  C)-98.3  F (36.8  C)] 98.1  F (36.7  C)  Pulse:  [78-86] 86  Resp:  [18-20] 20  BP: ()/(64-72) 102/64  Cuff Mean (mmHg):  [75-84] 84  SpO2:  [95 %-96 %] 95 %   General: in no apparent distress, just appears fatigued  HEENT: no evidence of trauma, normocephalic  Cardiac: regular rate and rhythm  Respiratory: tolerating room air, no use of accessory muscles, no audible wheezing  Extremities: no lower extremity edema, pain when checking for clonus at the left ankle    Mental status: awake and alert, speech is fluent, follows commands  Cranial nerves: PERRL, EOMI, face is symmetric  Motor: 5/5 in  bilateral upper extremities  - Left le/5 with dorsiflexion, 3-4/5 with plantarflexion, 1/5 at knee flexion/extension, 1/5 at hip flexion but no clear effort, reports that he can not abduct/adduct at the hip but he is seen spontaneously moving at the hip in the plane of the bed  - Right le/5 with dorsiflexion/plantarflexion, 4/5 at knee flexion, 2-3/5 with hip flexion  Sensory: intact to light touch in all four extremties  Coordination: no dysmetria noted in upper extremities  Reflexes: not hyperreflexic, seems hyporeflexic in bilateral lower extremities    Dysphagia Screen  Per Nursing    Stroke Scales    NIHSS  Interval baseline (21)   Interval Comments     1a. Level of Consciousness 0-->Alert, keenly responsive   1b. LOC Questions 0-->Answers both questions correctly   1c. LOC Commands 0-->Performs both tasks correctly   2.   Best Gaze 0-->Normal   3.   Visual 0-->No visual loss   4.   Facial Palsy 0-->Normal symmetrical movements   5a. Motor Arm, Left 0-->No drift, limb holds 90 (or 45) degrees for full 10 secs   5b. Motor Arm, Right 0-->No drift, limb holds 90 (or 45) degrees for full 10 secs   6a. Motor Leg, Left 3-->No effort against gravity, leg falls to bed immediately   6b. Motor Leg, right 2-->Some effort against gravity, leg falls to bed by 5 secs, but has some effort against gravity   7.   Limb Ataxia 0-->Absent   8.   Sensory 0-->Normal, no sensory loss   9.   Best Language 0-->No aphasia, normal   10. Dysarthria 0-->Normal   11. Extinction and Inattention  0-->No abnormality   Total 5 (21 1241)     Imaging  I personally reviewed all imaging; relevant findings per HPI.     OSH Imagin20 MRI Brain, Cervical Spine, Thoracic Spine, and Lumbar Spine with IV contrast:  IMPRESSION:  HEAD MRI:   1.  No abnormal enhancement.  CERVICAL SPINE MRI:  1.  Motion degraded exam with no abnormal enhancement.  THORACIC SPINE MRI:  1.  Unchanged small focus of T2 hyperintensity in the  ventral thoracic spinal cord at the superior T4 level with no associated abnormal enhancement. In addition, there is no abnormal enhancement in the thoracic spinal cord elsewhere.  LUMBAR SPINE MRI:  1.  No abnormal enhancement at the inferior aspect of the spinal cord or the conus tip. No abnormal enhancement of the cauda equina nerve roots.  2.  Recent noncontrast lumbar spine MRI demonstrated a diffuse nonspecific STIR hyperintense edema in the dorsal paraspinal musculature. This edema demonstrates an associated enhancement, with no associated rim-enhancing fluid collection to suggest abscess.    12/30/20 MRI Brain, Cervical Spine, Thoracic Spine, and Lumbar Spine without IV contrast:  IMPRESSION:  MRI BRAIN:  1. Scattered small foci of acute or subacute ischemia are seen within both cerebral hemispheres and left cerebellum. Involvement of multiple vascular territory suggests a central/embolic source.  2. Left-sided retinal detachment.  3. No finding for intracranial mass or hemorrhage.  MRI CERVICAL SPINE:  1. Predominantly mild cervical spondylosis with more moderate degenerative change C5-C6 and C6-C7.  2. No spinal canal stenosis. Multilevel foraminal stenosis as described.  3. Cervical cord is normal in appearance.  MRI THORACIC SPINE:  1. There is a linear band of abnormal T2 prolongation within the left ventral aspect of the cord at the T4 level. Although this could reflect an area of demyelination, other etiologies including neoplasm cannot be excluded. Suggest correlation with postcontrast enhanced imaging. No associated cord volume loss or expansion.  2. Mid and lower thoracic spine are suboptimally visualized secondary to pulsation and/or motion artifact. Allowing for artifact, no convincing finding for cord lesion, focal volume loss, or expansion in these areas.  3. Large bilateral pleural effusions.  4. Minor degenerative changes thoracic spine without spinal canal or neural foraminal stenosis.  MRI  LUMBAR SPINE:  1. Mild lumbar spondylosis without significant spinal canal stenosis.  2. Mild foraminal narrowing L3-L4, L4-L5 and on the left at L5-S1 with more mild to moderate right foraminal stenosis at L5-S1.  3. Distal spinal cord is unremarkable.    12/27/20 Mri C, T, and L Spine without IV contrast:  IMPRESSION:  1.  Limited and incomplete exam, as detailed above.  2.  Incompletely evaluated 1 cm T2 hyperintensity in the spinal cord at the level of T3-T4.  3.  Age indeterminate infarct in one of the cerebellar hemispheres.  4.  Bilateral pleural effusions.  5.  Recommend repeating the exam with brain and total spine imaging with and without IV contrast.    Lab Results Data   CBC  Recent Labs   Lab 01/03/21  1130 01/02/21  2130   WBC 14.3* 14.4*   RBC 3.59* 3.69*   HGB 11.5* 11.7*   HCT 35.3* 36.1*    214     Basic Metabolic Panel    Recent Labs   Lab 01/03/21  0809 01/02/21  2130    138   POTASSIUM 4.1 4.2   CHLORIDE 103 101   CO2 29 31   BUN 35* 36*   CR 1.52* 1.47*   * 149*   CAMILLE 8.9 8.5     Liver Panel  Recent Labs   Lab 01/02/21 2130   PROTTOTAL 6.1*   ALBUMIN 2.4*   BILITOTAL 0.8   ALKPHOS 116   AST 51*   *     INR    Recent Labs   Lab Test 01/02/21 2130   INR 1.20*      Lipid Profile  No lab results found.  A1C    Recent Labs   Lab Test 01/03/21  0809   A1C 6.1*     Troponin I  No results for input(s): TROPI in the last 168 hours.       Stroke Code / Stroke Consult Data Data   This was a non-emergent, non-tele stroke consult.

## 2021-01-03 NOTE — PLAN OF CARE
A&Ox4. VSS on 4L NC. Denies pain. Patient states he is exhausted. Nausea and dizziness with movement. 2 x large soft stools. IV lasix x1, voiding via armenta. CXR, 12lead EKG, Echo, Head CT performed. Bilateral lower extremity weakness, mobility deficit in left leg. Assist of 2, utilizing lift. Bed weight. Cards 2 primary, neurology consulted. Plan for possible CHRISTOPHE and RHC tomorrow. NPO at midnight. Will continue POC.

## 2021-01-03 NOTE — CONSULTS
"[facility] PATIENT CONSULTATION [facility]  Consulted by: Medicine  Reason for consultation: Cold right foot    HPI:  iWlly Harrell is a 60 year old year old male who has a PMH significant for DM2, HTN, who recently had STEMI and presented to OSH for PCI of the LAD. Required IABP for support afterwards. Was noticed to have a cool R foot and so an US was done which showed occlusion of the R DP and sluggish flow in the R PT with diffuse plaque in the R femoropopliteal system. Today, he does not complain of any pain, parasthesia, or weakness in that foot. He states that it has always been cool since he was a child. He is not aware of any vascular disease. He has some weakness of the LLE; this has been worked up at the OSH and seems to be neurologic in origin, as the patient has multiple CNS infarcts.    Review Of Systems:   General: Denies F/C  Respiratory: Denies SOB  Cardio: Denies CP  Gastrointestinal: Denies N/V  Genitourinary: Denies recent change in urination  Musculoskeletal: See HPI  Neurologic: Denies HA  Psychiatric: Denies confusion  Hematology/immunology: no unexpected bruising    PAST MEDICAL HISTORY:  No past medical history on file.   DM2  HTN  STEMI s/p PCI  Osteomyelitis    PAST SURGICAL HISTORY:  No past surgical history on file.    FAMILY HISTORY:  No family history on file.    SOCIAL HISTORY:   Social History     Tobacco Use     Smoking status: Not on file   Substance Use Topics     Alcohol use: Not on file       TOBACCO USE:  Denies use      HOME MEDICATIONS:  Prior to Admission medications    Not on File       VITAL SIGNS:  BP 99/71 (BP Location: Right arm)   Pulse 84   Temp 98.2  F (36.8  C) (Oral)   Resp 18   Ht 1.93 m (6' 4\")   Wt 74.8 kg (165 lb)   SpO2 95%   BMI 20.08 kg/m    No intake or output data in the 24 hours ending 01/02/21 2225    Labs:  ROUTINE IP LABS (Last four results)  BMP  Recent Labs   Lab 01/02/21  2130      POTASSIUM 4.2   CHLORIDE 101   CAMILLE 8.5   CO2 " 31   BUN 36*   CR 1.47*   *     CBC  Recent Labs   Lab 01/02/21 2130   WBC 14.4*   RBC 3.69*   HGB 11.7*   HCT 36.1*   MCV 98   MCH 31.7   MCHC 32.4   RDW 13.8        INR  Recent Labs   Lab 01/02/21 2130   INR 1.20*       PHYSICAL EXAM:  Constitutional: healthy, alert, no acute distress and cooperative   Cardiovascular: RRR  Respiratory: CTAB anteriorly, breathing unlabored without secondary muscle use  Psychiatric: mentation appears normal and affect normal/bright  Neck: no asymmetry  GI/Abdomen: +BS, abdomen soft, non-tender. No masses, no CVAT  MSK: weakness on LLE  Extremities: no open lesions, LLE wwp. Palpable bilateral popliteal pulses. Dopplerable bilateral DP and PT pulses. Intact and symmetric sensation and movement of feet. R foot slightler cooler and paler than L.   Hematology: no bruising on visible skin      Patient Active Problem List   Diagnosis     Heart failure, systolic, acute on chronic (H)       ASSESSMENT:  60 year old year old male who has a PMH significant for DM2, HTN, who recently had STEMI and presented to OSH for PCI of the LAD. Required IABP for support afterwards. Has a cool R foot without obvious evidence of vascular insufficiency on that side.       PLAN:  - Bilateral duplex arterial US  - Vascular will re-evaluate in AM    Discussed pt history, exam, assessment and plan with the fellow, who will discuss with staff.    Jag Tabor MD  Vascular Surgery Resident

## 2021-01-03 NOTE — PROGRESS NOTES
[facility]     Cardiology History and Physical - Cards 2    Date of Admission: 1/2/2021    Assessment & Plan: HVSL    Willy Harrell is a 60 year old male admitted on 1/2/2021. He has PMhx of T2DM with proliferative DR, HTN, osteomyelitis who was transferred to [facility] from [facility] for cardiac intervention for late-presentation of anterior STEMI (12/22/2020) now s/p PCI to LAD/Diag. Initially in cardiogenic shock secondary to ICM with HFrEF, weaned off IABP and pressors by 12/23. Hospital course complicated by hypoxic respiratory failure secondary to pulmonary edema, LLE weakness with scattered acute/subacute ischemic lesion on MRI 12/27, sustained VT s/p ICD 12/31. Transferred to [facility] (1/2/2021) for further management and possible advanced heart failure therapy evaluation.    CAD with anterior STEMI s/p PCI  Cardiogenic shock (resolved) secondary to acute decompensated HFrEF from ICM  Sustained VT s/p ICD  Moderate MR    Overall the patient appears to have recovred some LVEF function based on repeat TTE here. Still volume overloaded, Moderate MR again is visualized     -Repeat TTE today  -BID BMP and end organ function  -Lactic acid in AM  -Hold BB, ACE, or afterload agents until after the RHC tomorrow  -Diuresis with 40 mg IV lasix BID  -Daily Chest xray given respiratory failure  -Continue ASA and Brillenta, High intensity statin  -Repeat A1c and Lipid panel  -RHC tomorrow, NPO at midnight  -ICD interrogation , Urbster       Acute hypoxic respiratory failure secondary to cardiogenic pulmonary edema     Has recurrent pulmonary edema with increased O2 requirement, extubated 12/24 now stable on 4L NC. CXR consistent with pulmonary edema with bilateral pleural effusion. Differential include evolving pneumonia but is less likely. Completed a course of ceftriaxone 12/28.  - O2 support, keep SpO2 > 92%  - Diuresis as above  - Closely monitor  I/O, weight, BMP  - Daily chest xray     PAD  Poikilothermia of R foot, per patient this has been his chronic problem. US arterial RLE (12/22) with occlusion of DPA. Patent but with slow flow within PTA. No significant stenosis within the femoral-popliteal segment. US DVT (12/22) with no evidence of DVT in RLE. No pain, intact motor power, and sensation, less concerns for acute limb ischemia (also given negative lactate and CK) and more consistent with PAD with chronic arterial insufficiency.  - Vascular surgery consult, no surgical intervention at this time    LLE weakness  Vertigo   Developed LLE weakness after hospitalization, unclear etiology. Head MRI initially with multiple foci of small areas of acute or subacute infarction (12/30) but with no abnormal enhancement on contrast MRI in 12/31. Spine MRI with stable small focus of T2 hyperintensity in ventral thoracic spinal cord at superior T4 level.     - CHRISTOPHE to look for PARVIZ thrombus   - Hold-off anticoagulant for now given that etiology is unclear and no clear source of emboli  - Neurology consult  - PT/OT consult  -Meclizine for vertigo     ZAIN secondary to cardiorenal physiology  Baseline Cr ~1, was 3.12 on admission suspected to be secondary to cardiogenic shock/ATN, and cardiorenal physiology. Cr now stable at 1.5.  - Diuresis as above  - Closely monitor I/O, weight, BMP    T2DM with micro and macrovascular complications  Noted to have PDR. HgbA1c 6.2% on 12/22.  - BS AC&HS, target 140-180 mg/dL  - mSSI  - Hypoglycemic protocol    Ischemic hepatitis: Improving, trend LFT  Concern for GIB: Blood from OG tube on admission at [facility], Hgb stable, no recurrent bleeding. Continue Lansoprazole.  Urinary retention: Zafar placed 12/28  Anxiety: Continue Celexa 20 mg daily     Diet: Cardiac diet with fluid restriction  DVT Prophylaxis: Enoxaparin (Lovenox) SQ  Zafar Catheter: in place, indication: Retention  Code Status: FULL code  Fluids: None  Lines:  PIVs    Disposition Plan   Expected discharge: 4 - 7 days, recommended to prior living arrangement once fluid volume status optimized on oral medication and O2 requirement at baseline.    Entered: Gian aHrris MD 01/03/2021, 5:38 PM    Gian Harris  Cardiology fellow PGY 4   ______________________________________________________________________      Subjective:    Overall he continues to have vertigo, requires 4 L NC, no shortness of breath or chest pain       Review of Systems    The 10 point Review of Systems is negative other than noted in the HPI or here.    Past Medical History    I have reviewed this patient's medical history and updated it with pertinent information if needed.  Past medical history is as described above.    Past Surgical History   I have reviewed this patient's surgical history and updated it with pertinent information if needed.  No past surgical history on file.    Social History   I have reviewed this patient's social history and updated it with pertinent information if needed.  Social History     Tobacco Use     Smoking status: Not on file   Substance Use Topics     Alcohol use: Not on file     Drug use: Not on file     Family History   I have reviewed this patient's family history and updated it with pertinent information if needed. Noncontributory.    Allergies   No Known Allergies    Physical Exam   Vital Signs: Temp: 98  F (36.7  C) Temp src: Oral BP: 101/66 Pulse: 88   Resp: 20 SpO2: 95 % O2 Device: Nasal cannula Oxygen Delivery: 4 LPM  Weight: 164 lbs 0 oz  Constitutional: awake, alert, cooperative, no apparent distress  Eyes: Ptosis of left eye (at baseline per patient) with cataracts and non-react pupil. R eye is normal, round and reactive to light, extra ocular muscles intact, sclera clear, conjunctiva normal  Neck: JVP ~12 cm  Respiratory: No increased work of breathing, crackles at bases, no wheezing  Cardiovascular: Regular rate and rhythm, normal S1 and S2, systolic  murmur grade 3/6 best heard at apex  GI: No scars, normal bowel sounds, soft, non-distended, non-tender  Genitounirinary: Zafar catheter  Skin: no bruising or bleeding  Musculoskeletal: 1+ lower extremity pitting edema present. Poikilothermia of R foot with no pain on active/passive movement, intact sensation and motor power. Faint DPA pulse with no PTA pulse.  Neurologic: Awake, alert, oriented to name, place and time. Cranial nerves II-XII are grossly intact. Motor is 5 out of 5 on both upper extremities. Grade 3/5 on RLE and 2/5 on LLE. Sensory is intact.     Data   Data reviewed today: I reviewed all medications, new labs and imaging results over the last 24 hours and discussed as pertinent in assessment and plan.    Recent Labs   Lab 01/03/21  1631 01/03/21  1130 01/03/21  0809 01/02/21  2130   WBC  --  14.3*  --  14.4*   HGB  --  11.5*  --  11.7*   MCV  --  98  --  98   PLT  --  201  --  214   INR  --   --   --  1.20*     --  139 138   POTASSIUM 4.1  --  4.1 4.2   CHLORIDE 102  --  103 101   CO2 32  --  29 31   BUN 37*  --  35* 36*   CR 1.55*  --  1.52* 1.47*   ANIONGAP 6  --  7 6   CAMILLE 8.7  --  8.9 8.5   *  --  145* 149*   ALBUMIN 2.4*  --   --  2.4*   PROTTOTAL 5.8*  --   --  6.1*   BILITOTAL 0.8  --   --  0.8   ALKPHOS 115  --   --  116   ALT 92*  --   --  106*   AST 51*  --   --  51*

## 2021-01-03 NOTE — PLAN OF CARE
D: Admitted 1/2 from [facility] for STEMI s/p PCI to LAD. C/b hypoxic respiratory failure 2/2 to pulmonary edema, LLE weakness with CVA, sustained VT s/p ICD. Hx of type 2 diabetes, HTN, and osteomyelitis.     I: Monitored vitals and assessed pt status.   Changed:  Saline locked x2 PIV   PRN: tylenol @ 0030; 40 mg IV Lasix given.     A: A0x4. VSS. Afebrile. On 4L O2 NC. SR. Zafar catheter in place for retention. LLE weakness, weak pulses on BLE. Cool right leg. Numbness/tingling in feet and fingertips baseline. ICD incision covered, CDI, scant drainage. Incontinent of stool, x2 BM. Intermittent nausea. 1800 FR, 2g Na diet. 2-3 assistance with repositioning. Unable to get weight on admission, took stated weight by patient. +1/+2 edema BLE. Offered repositioning throughout night, denied occasionally. mepilex to coccyx, redness blanchable.       P: Continue to monitor Pt status and report changes to CARDS 2.

## 2021-01-03 NOTE — H&P
[facility]     Cardiology History and Physical - Cards 2    Date of Admission: 1/2/2021    Assessment & Plan: HVSL    Willy Harrell is a 60 year old male admitted on 1/2/2021. He has PMhx of T2DM with proliferative DR, HTN, osteomyelitis who was transferred to [facility] from [facility] for cardiac intervention for late-presentation of anterior STEMI (12/22/2020) now s/p PCI to LAD/Diag. Initially in cardiogenic shock secondary to ICM with HFrEF, weaned off IABP and pressors by 12/23. Hospital course complicated by hypoxic respiratory failure secondary to pulmonary edema, LLE weakness with scattered acute/subacute ischemic lesion on MRI 12/27, sustained VT s/p ICD 12/31. Transferred to [facility] (1/2/2021) for further management and possible advanced heart failure therapy evaluation.    CAD with anterior STEMI s/p PCI  Cardiogenic shock (resolved) secondary to acute decompensated HFrEF from ICM  Sustained VT s/p ICD  Moderate MR  Initially admitted to [facility] for cardiac intervention for late-presentation of anterior STEMI (12/22/2020) now s/p PCI to LAD/Diag. Initially in cardiogenic shock secondary to ICM with HFrEF, weaned off IABP and pressors by 12/23.  Coronary angiogram (12/22/2020) Thrombotic occlusion of proximal LAD s/p PCI with penumbra thrombectomy and placement of Synergy BANDAR x2 to proximal and mid-LAD + POBA of large D2. TTE (1/1/2021) Severely reduced LV function (LVEF 25%) with akinesis of mid-distal anteroseptal wall, anterior, and apical walls, moderate MR.  Patient has problems with recurrent pulmonary edema and hypotension with low pulse pressure precluding up-titration of GDMT and adequate diuresis.  - Heart failure management  Afterload reduction/ACEi: No ACEi due to ZAIN. Hold Isordil for now (on 5 mg TID prior to transfer)  BB: Hold due to hypotension (on Metoprolol 12.5 mg BID prior to transfer)  Aldosterone antagonist: None.  Prioritizing other medications for GDMT.  SCD prophylaxis: ICD  Volume management: Hypervolemic, on Lasix 40 mg IV BID prior to transfer. Lasix 40 mg IV one dose given on admission, will reassess response  - Continue ASA 81 mg daily with Ticagrelor 90 mg BID  - Continue Atorvastatin 40 mg daily  - Possible evaluation for advance heart failure therapy    Acute hypoxic respiratory failure secondary to cardiogenic pulmonary edema with bilateral pleural effusion  Has recurrent pulmonary edema with increased O2 requirement, extubated 12/24 now stable on 4L NC. CXR consistent with pulmonary edema with bilateral pleural effusion. Differential include evolving pneumonia but is less likely. Completed a course of ceftriaxone 12/28.  - O2 support, keep SpO2 > 92%  - Diuresis as above  - Closely monitor I/O, weight, BMP    PAD  Poikilothermia of R foot, per patient this has been his chronic problem. US arterial RLE (12/22) with occlusion of DPA. Patent but with slow flow within PTA. No significant stenosis within the femoral-popliteal segment. US DVT (12/22) with no evidence of DVT in RLE. No pain, intact motor power, and sensation, less concerns for acute limb ischemia (also given negative lactate and CK) and more consistent with PAD with chronic arterial insufficiency.  - Vascular surgery consult    LLE weakness  Developed LLE weakness after hospitalization, unclear etiology. Head MRI initially with multiple foci of small areas of acute or subacute infarction (12/30) but with no abnormal enhancement on contrast MRI in 12/31. Spine MRI with stable small focus of T2 hyperintensity in ventral thoracic spinal cord at superior T4 level. Followed [facility]y at [facility] who felt that lesions on imaging are not consistent with neurological deficit, plan to evaluate with CHRISTOPHE and EMG.  - Hold-off anticoagulant for now given that etiology is unclear and no clear source of emboli  - Neurology consult  - PT/OT consult    ZAIN secondary to  cardiorenal physiology  Baseline Cr ~1, was 3.12 on admission suspected to be secondary to cardiogenic shock/ATN, and cardiorenal physiology. Cr now stable at 1.5.  - Diuresis as above  - Closely monitor I/O, weight, BMP    T2DM with micro and macrovascular complications  Noted to have PDR. HgbA1c 6.2% on 12/22.  - BS AC&HS, target 140-180 mg/dL  - mSSI  - Hypoglycemic protocol    Ischemic hepatitis: Improving, trend LFT  Concern for GIB: Blood from OG tube on admission at [facility], Hgb stable, no recurrent bleeding. Continue Lansoprazole.  Urinary retention: Zafar placed 12/28  Anxiety: Continue Celexa 20 mg daily     Diet: Cardiac diet with fluid restriction  DVT Prophylaxis: Enoxaparin (Lovenox) SQ  Zafar Catheter: in place, indication: Retention  Code Status: FULL code  Fluids: None  Lines: PIVs    Disposition Plan   Expected discharge: 4 - 7 days, recommended to prior living arrangement once fluid volume status optimized on oral medication and O2 requirement at baseline.    Entered: Nita Smith MD 01/03/2021, 12:20 AM    Nita Smith MD  Internal Medicine PGY-2  P: [phone]  [facility]   Please see sign in/sign out for up to date coverage information  ______________________________________________________________________    Chief Complaint   SOB for 1 week    History is obtained from the patient and chart review    History of Present Illness   Willy Harrell is a 60 year old male who has PMhx of T2DM with proliferative DR, HTN, osteomyelitis who was transferred to [facility] from [facility] for cardiac intervention for late-presentation of anterior STEMI (12/22/2020) now s/p PCI to LAD/Diag. Initially in cardiogenic shock secondary to ICM with HFrEF, weaned off IABP and pressors by 12/23. Hospital course complicated by hypoxic respiratory failure secondary to pulmonary edema, LLE weakness with scattered acute/subacute ischemic lesion on  MRI 12/27, sustained VT s/p ICD 12/31. Transferred to [facility] (1/2/2021) for further management and possible advanced heart failure therapy evaluation.    Patient states that he feels fine, breathing is okay. Complains of generalized pain which he attributes to being transferred. Denies recent chest pain or palpitation. States that he lost his strength in L leg after he woke up in the hospital but feels that strength is improving compared to yesterday. Reports that he has cold feet for a long time, denies pain at this time. Denies fever, headache, or back pain.    Review of Systems    The 10 point Review of Systems is negative other than noted in the HPI or here.    Past Medical History    I have reviewed this patient's medical history and updated it with pertinent information if needed.  Past medical history is as described above.    Past Surgical History   I have reviewed this patient's surgical history and updated it with pertinent information if needed.  No past surgical history on file.    Social History   I have reviewed this patient's social history and updated it with pertinent information if needed.  Social History     Tobacco Use     Smoking status: Not on file   Substance Use Topics     Alcohol use: Not on file     Drug use: Not on file     Family History   I have reviewed this patient's family history and updated it with pertinent information if needed. Noncontributory.    Allergies   No Known Allergies    Physical Exam   Vital Signs: Temp: 98.2  F (36.8  C) Temp src: Oral BP: 99/71 Pulse: 84   Resp: 18 SpO2: 95 % O2 Device: Nasal cannula Oxygen Delivery: 4 LPM  Weight: 165 lbs 0 oz  Constitutional: awake, alert, cooperative, no apparent distress  Eyes: Ptosis of left eye (at baseline per patient) with cataracts and non-react pupil. R eye is normal, round and reactive to light, extra ocular muscles intact, sclera clear, conjunctiva normal  Neck: JVP ~12 cm  Respiratory: No increased work of breathing,  crackles at bases, no wheezing  Cardiovascular: Regular rate and rhythm, normal S1 and S2, systolic murmur grade 3/6 best heard at apex  GI: No scars, normal bowel sounds, soft, non-distended, non-tender  Genitounirinary: Zafar catheter  Skin: no bruising or bleeding  Musculoskeletal: 1+ lower extremity pitting edema present. Poikilothermia of R foot with no pain on active/passive movement, intact sensation and motor power. Faint DPA pulse with no PTA pulse.  Neurologic: Awake, alert, oriented to name, place and time. Cranial nerves II-XII are grossly intact. Motor is 5 out of 5 on both upper extremities. Grade 3/5 on RLE and 2/5 on LLE. Sensory is intact.     Data   Data reviewed today: I reviewed all medications, new labs and imaging results over the last 24 hours and discussed as pertinent in assessment and plan.    Recent Labs   Lab 01/02/21  2130   WBC 14.4*   HGB 11.7*   MCV 98      INR 1.20*      POTASSIUM 4.2   CHLORIDE 101   CO2 31   BUN 36*   CR 1.47*   ANIONGAP 6   CAMILLE 8.5   *   ALBUMIN 2.4*   PROTTOTAL 6.1*   BILITOTAL 0.8   ALKPHOS 116   *   AST 51*

## 2021-01-04 ENCOUNTER — APPOINTMENT (OUTPATIENT)
Dept: PHYSICAL THERAPY | Facility: CLINIC | Age: 61
End: 2021-01-04
Attending: INTERNAL MEDICINE
Payer: MEDICAID

## 2021-01-04 ENCOUNTER — APPOINTMENT (OUTPATIENT)
Dept: GENERAL RADIOLOGY | Facility: CLINIC | Age: 61
End: 2021-01-04
Attending: INTERNAL MEDICINE
Payer: MEDICAID

## 2021-01-04 ENCOUNTER — ANCILLARY PROCEDURE (OUTPATIENT)
Dept: CARDIOLOGY | Facility: CLINIC | Age: 61
End: 2021-01-04
Attending: INTERNAL MEDICINE
Payer: MEDICAID

## 2021-01-04 ENCOUNTER — ANESTHESIA EVENT (OUTPATIENT)
Dept: SURGERY | Facility: CLINIC | Age: 61
End: 2021-01-04
Payer: MEDICAID

## 2021-01-04 ENCOUNTER — ANESTHESIA (OUTPATIENT)
Dept: SURGERY | Facility: CLINIC | Age: 61
End: 2021-01-04
Payer: MEDICAID

## 2021-01-04 LAB
ALBUMIN SERPL-MCNC: 2.2 G/DL (ref 3.4–5)
ALBUMIN SERPL-MCNC: 2.2 G/DL (ref 3.4–5)
ALP SERPL-CCNC: 105 U/L (ref 40–150)
ALP SERPL-CCNC: 92 U/L (ref 40–150)
ALT SERPL W P-5'-P-CCNC: 72 U/L (ref 0–70)
ALT SERPL W P-5'-P-CCNC: 81 U/L (ref 0–70)
ANION GAP SERPL CALCULATED.3IONS-SCNC: 6 MMOL/L (ref 3–14)
ANION GAP SERPL CALCULATED.3IONS-SCNC: 6 MMOL/L (ref 3–14)
ANION GAP SERPL CALCULATED.3IONS-SCNC: 7 MMOL/L (ref 3–14)
APTT PPP: 26 SEC (ref 22–37)
AST SERPL W P-5'-P-CCNC: 34 U/L (ref 0–45)
AST SERPL W P-5'-P-CCNC: 39 U/L (ref 0–45)
BILIRUB DIRECT SERPL-MCNC: 0.2 MG/DL (ref 0–0.2)
BILIRUB DIRECT SERPL-MCNC: 0.2 MG/DL (ref 0–0.2)
BILIRUB SERPL-MCNC: 0.7 MG/DL (ref 0.2–1.3)
BILIRUB SERPL-MCNC: 0.8 MG/DL (ref 0.2–1.3)
BUN SERPL-MCNC: 34 MG/DL (ref 7–30)
BUN SERPL-MCNC: 35 MG/DL (ref 7–30)
BUN SERPL-MCNC: 35 MG/DL (ref 7–30)
CALCIUM SERPL-MCNC: 8.5 MG/DL (ref 8.5–10.1)
CALCIUM SERPL-MCNC: 8.6 MG/DL (ref 8.5–10.1)
CALCIUM SERPL-MCNC: 8.6 MG/DL (ref 8.5–10.1)
CHLORIDE SERPL-SCNC: 102 MMOL/L (ref 94–109)
CHLORIDE SERPL-SCNC: 103 MMOL/L (ref 94–109)
CHLORIDE SERPL-SCNC: 103 MMOL/L (ref 94–109)
CHOLEST SERPL-MCNC: 115 MG/DL
CO2 SERPL-SCNC: 31 MMOL/L (ref 20–32)
CO2 SERPL-SCNC: 32 MMOL/L (ref 20–32)
CO2 SERPL-SCNC: 33 MMOL/L (ref 20–32)
CREAT SERPL-MCNC: 1.49 MG/DL (ref 0.66–1.25)
CREAT SERPL-MCNC: 1.53 MG/DL (ref 0.66–1.25)
CREAT SERPL-MCNC: 1.57 MG/DL (ref 0.66–1.25)
ERYTHROCYTE [DISTWIDTH] IN BLOOD BY AUTOMATED COUNT: 14 % (ref 10–15)
ERYTHROCYTE [DISTWIDTH] IN BLOOD BY AUTOMATED COUNT: 14.2 % (ref 10–15)
GFR SERPL CREATININE-BSD FRML MDRD: 47 ML/MIN/{1.73_M2}
GFR SERPL CREATININE-BSD FRML MDRD: 48 ML/MIN/{1.73_M2}
GFR SERPL CREATININE-BSD FRML MDRD: 50 ML/MIN/{1.73_M2}
GLUCOSE BLDC GLUCOMTR-MCNC: 123 MG/DL (ref 70–99)
GLUCOSE BLDC GLUCOMTR-MCNC: 126 MG/DL (ref 70–99)
GLUCOSE BLDC GLUCOMTR-MCNC: 130 MG/DL (ref 70–99)
GLUCOSE BLDC GLUCOMTR-MCNC: 145 MG/DL (ref 70–99)
GLUCOSE BLDC GLUCOMTR-MCNC: 149 MG/DL (ref 70–99)
GLUCOSE BLDC GLUCOMTR-MCNC: 173 MG/DL (ref 70–99)
GLUCOSE SERPL-MCNC: 124 MG/DL (ref 70–99)
GLUCOSE SERPL-MCNC: 136 MG/DL (ref 70–99)
GLUCOSE SERPL-MCNC: 184 MG/DL (ref 70–99)
HCT VFR BLD AUTO: 34.1 % (ref 40–53)
HCT VFR BLD AUTO: 34.3 % (ref 40–53)
HDLC SERPL-MCNC: 33 MG/DL
HGB BLD-MCNC: 10.5 G/DL (ref 13.3–17.7)
HGB BLD-MCNC: 10.8 G/DL (ref 13.3–17.7)
INTERPRETATION ECG - MUSE: NORMAL
INTERPRETATION ECG - MUSE: NORMAL
LACTATE BLD-SCNC: 1.2 MMOL/L (ref 0.7–2)
LDLC SERPL CALC-MCNC: 62 MG/DL
MAGNESIUM SERPL-MCNC: 2.2 MG/DL (ref 1.6–2.3)
MCH RBC QN AUTO: 31 PG (ref 26.5–33)
MCH RBC QN AUTO: 31.2 PG (ref 26.5–33)
MCHC RBC AUTO-ENTMCNC: 30.8 G/DL (ref 31.5–36.5)
MCHC RBC AUTO-ENTMCNC: 31.5 G/DL (ref 31.5–36.5)
MCV RBC AUTO: 101 FL (ref 78–100)
MCV RBC AUTO: 99 FL (ref 78–100)
NONHDLC SERPL-MCNC: 82 MG/DL
PLATELET # BLD AUTO: 202 10E9/L (ref 150–450)
PLATELET # BLD AUTO: 202 10E9/L (ref 150–450)
POTASSIUM SERPL-SCNC: 4 MMOL/L (ref 3.4–5.3)
PROT SERPL-MCNC: 5.6 G/DL (ref 6.8–8.8)
PROT SERPL-MCNC: 5.8 G/DL (ref 6.8–8.8)
RBC # BLD AUTO: 3.39 10E12/L (ref 4.4–5.9)
RBC # BLD AUTO: 3.46 10E12/L (ref 4.4–5.9)
SODIUM SERPL-SCNC: 140 MMOL/L (ref 133–144)
SODIUM SERPL-SCNC: 141 MMOL/L (ref 133–144)
SODIUM SERPL-SCNC: 141 MMOL/L (ref 133–144)
TRIGL SERPL-MCNC: 101 MG/DL
UFH PPP CHRO-ACNC: 0.24 IU/ML
WBC # BLD AUTO: 12.2 10E9/L (ref 4–11)
WBC # BLD AUTO: 9.3 10E9/L (ref 4–11)

## 2021-01-04 PROCEDURE — 93325 DOPPLER ECHO COLOR FLOW MAPG: CPT

## 2021-01-04 PROCEDURE — 250N000011 HC RX IP 250 OP 636: Performed by: STUDENT IN AN ORGANIZED HEALTH CARE EDUCATION/TRAINING PROGRAM

## 2021-01-04 PROCEDURE — 250N000011 HC RX IP 250 OP 636: Performed by: NURSE PRACTITIONER

## 2021-01-04 PROCEDURE — 370N000017 HC ANESTHESIA TECHNICAL FEE, PER MIN

## 2021-01-04 PROCEDURE — 93283 PRGRMG EVAL IMPLANTABLE DFB: CPT

## 2021-01-04 PROCEDURE — 82248 BILIRUBIN DIRECT: CPT | Performed by: STUDENT IN AN ORGANIZED HEALTH CARE EDUCATION/TRAINING PROGRAM

## 2021-01-04 PROCEDURE — 36415 COLL VENOUS BLD VENIPUNCTURE: CPT | Performed by: STUDENT IN AN ORGANIZED HEALTH CARE EDUCATION/TRAINING PROGRAM

## 2021-01-04 PROCEDURE — 80061 LIPID PANEL: CPT | Performed by: STUDENT IN AN ORGANIZED HEALTH CARE EDUCATION/TRAINING PROGRAM

## 2021-01-04 PROCEDURE — 80053 COMPREHEN METABOLIC PANEL: CPT | Performed by: STUDENT IN AN ORGANIZED HEALTH CARE EDUCATION/TRAINING PROGRAM

## 2021-01-04 PROCEDURE — B246ZZ4 ULTRASONOGRAPHY OF RIGHT AND LEFT HEART, TRANSESOPHAGEAL: ICD-10-PCS | Performed by: STUDENT IN AN ORGANIZED HEALTH CARE EDUCATION/TRAINING PROGRAM

## 2021-01-04 PROCEDURE — 71045 X-RAY EXAM CHEST 1 VIEW: CPT | Mod: 26 | Performed by: RADIOLOGY

## 2021-01-04 PROCEDURE — 83605 ASSAY OF LACTIC ACID: CPT | Performed by: INTERNAL MEDICINE

## 2021-01-04 PROCEDURE — 93320 DOPPLER ECHO COMPLETE: CPT | Mod: 26 | Performed by: STUDENT IN AN ORGANIZED HEALTH CARE EDUCATION/TRAINING PROGRAM

## 2021-01-04 PROCEDURE — 258N000003 HC RX IP 258 OP 636: Performed by: NURSE ANESTHETIST, CERTIFIED REGISTERED

## 2021-01-04 PROCEDURE — 36415 COLL VENOUS BLD VENIPUNCTURE: CPT

## 2021-01-04 PROCEDURE — 76376 3D RENDER W/INTRP POSTPROCES: CPT

## 2021-01-04 PROCEDURE — 999N001017 HC STATISTIC GLUCOSE BY METER IP

## 2021-01-04 PROCEDURE — 85027 COMPLETE CBC AUTOMATED: CPT | Performed by: STUDENT IN AN ORGANIZED HEALTH CARE EDUCATION/TRAINING PROGRAM

## 2021-01-04 PROCEDURE — 97530 THERAPEUTIC ACTIVITIES: CPT | Mod: GP | Performed by: REHABILITATION PRACTITIONER

## 2021-01-04 PROCEDURE — 99233 SBSQ HOSP IP/OBS HIGH 50: CPT | Mod: GC | Performed by: INTERNAL MEDICINE

## 2021-01-04 PROCEDURE — 93325 DOPPLER ECHO COLOR FLOW MAPG: CPT | Mod: 26 | Performed by: STUDENT IN AN ORGANIZED HEALTH CARE EDUCATION/TRAINING PROGRAM

## 2021-01-04 PROCEDURE — 93312 ECHO TRANSESOPHAGEAL: CPT | Mod: 26 | Performed by: STUDENT IN AN ORGANIZED HEALTH CARE EDUCATION/TRAINING PROGRAM

## 2021-01-04 PROCEDURE — 999N000141 HC STATISTIC PRE-PROCEDURE NURSING ASSESSMENT

## 2021-01-04 PROCEDURE — 80048 BASIC METABOLIC PNL TOTAL CA: CPT | Performed by: INTERNAL MEDICINE

## 2021-01-04 PROCEDURE — 250N000024 HC ISOFLURANE, PER MIN

## 2021-01-04 PROCEDURE — 85520 HEPARIN ASSAY: CPT

## 2021-01-04 PROCEDURE — 360N000075 HC SURGERY LEVEL 2, PER MIN

## 2021-01-04 PROCEDURE — 80048 BASIC METABOLIC PNL TOTAL CA: CPT

## 2021-01-04 PROCEDURE — 214N000001 HC R&B CCU UMMC

## 2021-01-04 PROCEDURE — 76376 3D RENDER W/INTRP POSTPROCES: CPT | Mod: 26 | Performed by: STUDENT IN AN ORGANIZED HEALTH CARE EDUCATION/TRAINING PROGRAM

## 2021-01-04 PROCEDURE — 97110 THERAPEUTIC EXERCISES: CPT | Mod: GP | Performed by: REHABILITATION PRACTITIONER

## 2021-01-04 PROCEDURE — 93283 PRGRMG EVAL IMPLANTABLE DFB: CPT | Mod: 26 | Performed by: INTERNAL MEDICINE

## 2021-01-04 PROCEDURE — 250N000013 HC RX MED GY IP 250 OP 250 PS 637: Performed by: STUDENT IN AN ORGANIZED HEALTH CARE EDUCATION/TRAINING PROGRAM

## 2021-01-04 PROCEDURE — 71045 X-RAY EXAM CHEST 1 VIEW: CPT

## 2021-01-04 PROCEDURE — 97162 PT EVAL MOD COMPLEX 30 MIN: CPT | Mod: GP | Performed by: REHABILITATION PRACTITIONER

## 2021-01-04 PROCEDURE — 85730 THROMBOPLASTIN TIME PARTIAL: CPT | Performed by: STUDENT IN AN ORGANIZED HEALTH CARE EDUCATION/TRAINING PROGRAM

## 2021-01-04 PROCEDURE — 36415 COLL VENOUS BLD VENIPUNCTURE: CPT | Performed by: INTERNAL MEDICINE

## 2021-01-04 PROCEDURE — 710N000010 HC RECOVERY PHASE 1, LEVEL 2, PER MIN

## 2021-01-04 PROCEDURE — 250N000011 HC RX IP 250 OP 636: Performed by: NURSE ANESTHETIST, CERTIFIED REGISTERED

## 2021-01-04 PROCEDURE — 250N000011 HC RX IP 250 OP 636: Performed by: INTERNAL MEDICINE

## 2021-01-04 PROCEDURE — 250N000009 HC RX 250: Performed by: NURSE ANESTHETIST, CERTIFIED REGISTERED

## 2021-01-04 PROCEDURE — 80076 HEPATIC FUNCTION PANEL: CPT | Performed by: INTERNAL MEDICINE

## 2021-01-04 PROCEDURE — 83735 ASSAY OF MAGNESIUM: CPT | Performed by: STUDENT IN AN ORGANIZED HEALTH CARE EDUCATION/TRAINING PROGRAM

## 2021-01-04 PROCEDURE — 93320 DOPPLER ECHO COMPLETE: CPT

## 2021-01-04 RX ORDER — LIDOCAINE 40 MG/G
CREAM TOPICAL
Status: DISCONTINUED | OUTPATIENT
Start: 2021-01-04 | End: 2021-01-04 | Stop reason: HOSPADM

## 2021-01-04 RX ORDER — NALOXONE HYDROCHLORIDE 0.4 MG/ML
0.2 INJECTION, SOLUTION INTRAMUSCULAR; INTRAVENOUS; SUBCUTANEOUS
Status: ACTIVE | OUTPATIENT
Start: 2021-01-04 | End: 2021-01-05

## 2021-01-04 RX ORDER — FENTANYL CITRATE 50 UG/ML
INJECTION, SOLUTION INTRAMUSCULAR; INTRAVENOUS PRN
Status: DISCONTINUED | OUTPATIENT
Start: 2021-01-04 | End: 2021-01-04

## 2021-01-04 RX ORDER — NALOXONE HYDROCHLORIDE 0.4 MG/ML
0.2 INJECTION, SOLUTION INTRAMUSCULAR; INTRAVENOUS; SUBCUTANEOUS
Status: DISCONTINUED | OUTPATIENT
Start: 2021-01-04 | End: 2021-01-04 | Stop reason: HOSPADM

## 2021-01-04 RX ORDER — FENTANYL CITRATE 50 UG/ML
25 INJECTION, SOLUTION INTRAMUSCULAR; INTRAVENOUS
Status: DISCONTINUED | OUTPATIENT
Start: 2021-01-04 | End: 2021-01-04 | Stop reason: HOSPADM

## 2021-01-04 RX ORDER — FLUMAZENIL 0.1 MG/ML
0.2 INJECTION, SOLUTION INTRAVENOUS
Status: DISCONTINUED | OUTPATIENT
Start: 2021-01-04 | End: 2021-01-04 | Stop reason: HOSPADM

## 2021-01-04 RX ORDER — SODIUM CHLORIDE, SODIUM LACTATE, POTASSIUM CHLORIDE, CALCIUM CHLORIDE 600; 310; 30; 20 MG/100ML; MG/100ML; MG/100ML; MG/100ML
INJECTION, SOLUTION INTRAVENOUS CONTINUOUS PRN
Status: DISCONTINUED | OUTPATIENT
Start: 2021-01-04 | End: 2021-01-04

## 2021-01-04 RX ORDER — SODIUM CHLORIDE 9 MG/ML
INJECTION, SOLUTION INTRAVENOUS CONTINUOUS PRN
Status: DISCONTINUED | OUTPATIENT
Start: 2021-01-04 | End: 2021-01-04 | Stop reason: HOSPADM

## 2021-01-04 RX ORDER — NALOXONE HYDROCHLORIDE 0.4 MG/ML
0.4 INJECTION, SOLUTION INTRAMUSCULAR; INTRAVENOUS; SUBCUTANEOUS
Status: ACTIVE | OUTPATIENT
Start: 2021-01-04 | End: 2021-01-05

## 2021-01-04 RX ORDER — OXYCODONE HYDROCHLORIDE 5 MG/1
5 TABLET ORAL EVERY 4 HOURS PRN
Status: DISCONTINUED | OUTPATIENT
Start: 2021-01-04 | End: 2021-01-12 | Stop reason: HOSPADM

## 2021-01-04 RX ORDER — EPHEDRINE SULFATE 50 MG/ML
INJECTION, SOLUTION INTRAMUSCULAR; INTRAVENOUS; SUBCUTANEOUS PRN
Status: DISCONTINUED | OUTPATIENT
Start: 2021-01-04 | End: 2021-01-04

## 2021-01-04 RX ORDER — LIDOCAINE HYDROCHLORIDE 20 MG/ML
INJECTION, SOLUTION INFILTRATION; PERINEURAL PRN
Status: DISCONTINUED | OUTPATIENT
Start: 2021-01-04 | End: 2021-01-04

## 2021-01-04 RX ORDER — ACYCLOVIR 200 MG/1
9.5 CAPSULE ORAL
Status: DISCONTINUED | OUTPATIENT
Start: 2021-01-04 | End: 2021-01-04 | Stop reason: HOSPADM

## 2021-01-04 RX ORDER — FENTANYL CITRATE 50 UG/ML
25-50 INJECTION, SOLUTION INTRAMUSCULAR; INTRAVENOUS
Status: DISCONTINUED | OUTPATIENT
Start: 2021-01-04 | End: 2021-01-04 | Stop reason: HOSPADM

## 2021-01-04 RX ORDER — NALOXONE HYDROCHLORIDE 0.4 MG/ML
0.4 INJECTION, SOLUTION INTRAMUSCULAR; INTRAVENOUS; SUBCUTANEOUS
Status: DISCONTINUED | OUTPATIENT
Start: 2021-01-04 | End: 2021-01-04 | Stop reason: HOSPADM

## 2021-01-04 RX ORDER — FUROSEMIDE 10 MG/ML
40 INJECTION INTRAMUSCULAR; INTRAVENOUS 2 TIMES DAILY
Status: DISCONTINUED | OUTPATIENT
Start: 2021-01-05 | End: 2021-01-05

## 2021-01-04 RX ORDER — ONDANSETRON 4 MG/1
4 TABLET, ORALLY DISINTEGRATING ORAL EVERY 30 MIN PRN
Status: DISCONTINUED | OUTPATIENT
Start: 2021-01-04 | End: 2021-01-04 | Stop reason: HOSPADM

## 2021-01-04 RX ORDER — LIDOCAINE HYDROCHLORIDE 20 MG/ML
15 SOLUTION OROPHARYNGEAL ONCE
Status: DISCONTINUED | OUTPATIENT
Start: 2021-01-04 | End: 2021-01-04 | Stop reason: HOSPADM

## 2021-01-04 RX ORDER — PROPOFOL 10 MG/ML
INJECTION, EMULSION INTRAVENOUS PRN
Status: DISCONTINUED | OUTPATIENT
Start: 2021-01-04 | End: 2021-01-04

## 2021-01-04 RX ORDER — LORAZEPAM 2 MG/ML
2 INJECTION INTRAMUSCULAR ONCE
Status: COMPLETED | OUTPATIENT
Start: 2021-01-04 | End: 2021-01-04

## 2021-01-04 RX ORDER — PROPOFOL 10 MG/ML
INJECTION, EMULSION INTRAVENOUS CONTINUOUS PRN
Status: DISCONTINUED | OUTPATIENT
Start: 2021-01-04 | End: 2021-01-04

## 2021-01-04 RX ORDER — ONDANSETRON 2 MG/ML
4 INJECTION INTRAMUSCULAR; INTRAVENOUS EVERY 30 MIN PRN
Status: DISCONTINUED | OUTPATIENT
Start: 2021-01-04 | End: 2021-01-04 | Stop reason: HOSPADM

## 2021-01-04 RX ORDER — FENTANYL CITRATE 50 UG/ML
50 INJECTION, SOLUTION INTRAMUSCULAR; INTRAVENOUS ONCE
Status: DISCONTINUED | OUTPATIENT
Start: 2021-01-04 | End: 2021-01-04 | Stop reason: HOSPADM

## 2021-01-04 RX ORDER — LORAZEPAM 2 MG/ML
1 INJECTION INTRAMUSCULAR ONCE
Status: DISCONTINUED | OUTPATIENT
Start: 2021-01-04 | End: 2021-01-04

## 2021-01-04 RX ORDER — HYDROMORPHONE HYDROCHLORIDE 1 MG/ML
.3-.5 INJECTION, SOLUTION INTRAMUSCULAR; INTRAVENOUS; SUBCUTANEOUS EVERY 5 MIN PRN
Status: DISCONTINUED | OUTPATIENT
Start: 2021-01-04 | End: 2021-01-04 | Stop reason: HOSPADM

## 2021-01-04 RX ORDER — FUROSEMIDE 10 MG/ML
80 INJECTION INTRAMUSCULAR; INTRAVENOUS ONCE
Status: COMPLETED | OUTPATIENT
Start: 2021-01-04 | End: 2021-01-04

## 2021-01-04 RX ORDER — HEPARIN SODIUM 10000 [USP'U]/100ML
0-5000 INJECTION, SOLUTION INTRAVENOUS CONTINUOUS
Status: DISCONTINUED | OUTPATIENT
Start: 2021-01-04 | End: 2021-01-09

## 2021-01-04 RX ORDER — SODIUM CHLORIDE, SODIUM LACTATE, POTASSIUM CHLORIDE, CALCIUM CHLORIDE 600; 310; 30; 20 MG/100ML; MG/100ML; MG/100ML; MG/100ML
INJECTION, SOLUTION INTRAVENOUS CONTINUOUS
Status: DISCONTINUED | OUTPATIENT
Start: 2021-01-04 | End: 2021-01-04 | Stop reason: HOSPADM

## 2021-01-04 RX ADMIN — TOPICAL ANESTHETIC 1 SPRAY: 200 SPRAY DENTAL; PERIODONTAL at 14:07

## 2021-01-04 RX ADMIN — PROPOFOL 40 MG: 10 INJECTION, EMULSION INTRAVENOUS at 14:14

## 2021-01-04 RX ADMIN — PHENYLEPHRINE HYDROCHLORIDE 200 MCG: 10 INJECTION INTRAVENOUS at 14:37

## 2021-01-04 RX ADMIN — LIDOCAINE HYDROCHLORIDE 60 MG: 20 INJECTION, SOLUTION INFILTRATION; PERINEURAL at 14:14

## 2021-01-04 RX ADMIN — Medication 5 MG: at 14:29

## 2021-01-04 RX ADMIN — Medication 5 MG: at 14:15

## 2021-01-04 RX ADMIN — PHENYLEPHRINE HYDROCHLORIDE 100 MCG: 10 INJECTION INTRAVENOUS at 14:15

## 2021-01-04 RX ADMIN — ATORVASTATIN CALCIUM 40 MG: 40 TABLET, FILM COATED ORAL at 20:33

## 2021-01-04 RX ADMIN — MIDAZOLAM 1 MG: 1 INJECTION INTRAMUSCULAR; INTRAVENOUS at 14:07

## 2021-01-04 RX ADMIN — LORAZEPAM 2 MG: 2 INJECTION INTRAMUSCULAR; INTRAVENOUS at 11:01

## 2021-01-04 RX ADMIN — Medication 100 MG: at 14:14

## 2021-01-04 RX ADMIN — TICAGRELOR 90 MG: 90 TABLET ORAL at 20:34

## 2021-01-04 RX ADMIN — PHENYLEPHRINE HYDROCHLORIDE 100 MCG: 10 INJECTION INTRAVENOUS at 14:24

## 2021-01-04 RX ADMIN — FUROSEMIDE 40 MG: 10 INJECTION, SOLUTION INTRAVENOUS at 09:06

## 2021-01-04 RX ADMIN — PHENYLEPHRINE HYDROCHLORIDE 100 MCG: 10 INJECTION INTRAVENOUS at 14:30

## 2021-01-04 RX ADMIN — FUROSEMIDE 80 MG: 10 INJECTION, SOLUTION INTRAVENOUS at 17:35

## 2021-01-04 RX ADMIN — FENTANYL CITRATE 25 MCG: 50 INJECTION, SOLUTION INTRAMUSCULAR; INTRAVENOUS at 14:36

## 2021-01-04 RX ADMIN — ONDANSETRON 4 MG: 2 INJECTION INTRAMUSCULAR; INTRAVENOUS at 08:04

## 2021-01-04 RX ADMIN — SODIUM CHLORIDE, POTASSIUM CHLORIDE, SODIUM LACTATE AND CALCIUM CHLORIDE: 600; 310; 30; 20 INJECTION, SOLUTION INTRAVENOUS at 14:02

## 2021-01-04 RX ADMIN — PROPOFOL 50 MCG/KG/MIN: 10 INJECTION, EMULSION INTRAVENOUS at 14:07

## 2021-01-04 RX ADMIN — LANSOPRAZOLE 30 MG: 30 TABLET, ORALLY DISINTEGRATING, DELAYED RELEASE ORAL at 20:34

## 2021-01-04 RX ADMIN — HEPARIN SODIUM 900 UNITS/HR: 10000 INJECTION, SOLUTION INTRAVENOUS at 17:47

## 2021-01-04 ASSESSMENT — MIFFLIN-ST. JEOR: SCORE: 1650.4

## 2021-01-04 ASSESSMENT — ACTIVITIES OF DAILY LIVING (ADL)
ADLS_ACUITY_SCORE: 17
ADLS_ACUITY_SCORE: 18
ADLS_ACUITY_SCORE: 22
ADLS_ACUITY_SCORE: 18
ADLS_ACUITY_SCORE: 18

## 2021-01-04 NOTE — PLAN OF CARE
OT/6C: OT orders acknowledged. Per discussion with PT, pt will benefit from OT evaluation during inpatient stay. Pt at [facility] at time of attempt. Will re-attempt later this pm vs. Tomorrow as schedule allows.

## 2021-01-04 NOTE — ANESTHESIA PREPROCEDURE EVALUATION
"Anesthesia Pre-Procedure Evaluation    Patient: Willy Harrell   MRN:     [id number] Gender:   male   Age:    60 year old :      1960        Preoperative Diagnosis: Embolic stroke (H) [I63.9]   Procedure(s):  ECHOCARDIOGRAM, TRANSESOPHAGEAL, INTRAOPERATIVE     LABS:  CBC:   Lab Results   Component Value Date    WBC 12.2 (H) 2021    WBC 14.3 (H) 2021    HGB 10.8 (L) 2021    HGB 11.5 (L) 2021    HCT 34.3 (L) 2021    HCT 35.3 (L) 2021     2021     2021     BMP:   Lab Results   Component Value Date     2021     2021    POTASSIUM 4.0 2021    POTASSIUM 4.1 2021    CHLORIDE 103 2021    CHLORIDE 102 2021    CO2 32 2021    CO2 32 2021    BUN 35 (H) 2021    BUN 37 (H) 2021    CR 1.49 (H) 2021    CR 1.55 (H) 2021     (H) 2021     (H) 2021     COAGS:   Lab Results   Component Value Date    PTT 26 2021    INR 1.20 (H) 2021     POC:   Lab Results   Component Value Date     (H) 2021     OTHER:   Lab Results   Component Value Date    LACT 1.2 2021    A1C 6.1 (H) 2021    CAMILLE 8.6 2021    MAG 2.2 2021    ALBUMIN 2.2 (L) 2021    PROTTOTAL 5.6 (L) 2021    ALT 81 (H) 2021    AST 39 2021    ALKPHOS 105 2021    BILITOTAL 0.8 2021        Preop Vitals    BP Readings from Last 3 Encounters:   21 95/58    Pulse Readings from Last 3 Encounters:   21 89      Resp Readings from Last 3 Encounters:   21 16    SpO2 Readings from Last 3 Encounters:   21 96%      Temp Readings from Last 1 Encounters:   21 37.1  C (98.8  F) (Oral)    Ht Readings from Last 1 Encounters:   21 1.93 m (6' 4\")      Wt Readings from Last 1 Encounters:   21 74.4 kg (164 lb)    Estimated body mass index is 19.96 kg/m  as calculated from the following:    Height as of this " "encounter: 1.93 m (6' 4\").    Weight as of this encounter: 74.4 kg (164 lb).     LDA:  Peripheral IV 01/02/21 Left Upper forearm (Active)   Site Assessment [facility] 01/04/21 1200   Line Status Saline locked 01/04/21 1200   Phlebitis Scale 0-->no symptoms 01/03/21 2158   Infiltration Scale 0 01/03/21 2158   Number of days: 2       Peripheral IV 01/02/21 Right Wrist (Active)   Site Assessment [facility] 01/04/21 1200   Line Status Saline locked 01/04/21 1200   Phlebitis Scale 0-->no symptoms 01/03/21 2158   Infiltration Scale 0 01/03/21 2158   Number of days: 2       Urethral Catheter (Active)   Tube Description Positional 01/04/21 0900   Catheter Care Catheter wipes;Done 01/03/21 2055   Collection Container Standard 01/03/21 2055   Securement Method Securing device (Describe) 01/04/21 0900   Rationale for Continued Use Strict 1-2 Hour I&O;Retention 01/04/21 0900   Urine Output 500 mL 01/04/21 1200   Number of days: 2        Past Medical History:   Diagnosis Date     CVA (cerebral vascular accident) (H)      Diabetes (H)      HTN (hypertension)      STEMI (ST elevation myocardial infarction) (H)      Ventricular tachyarrhythmia (H)       History reviewed. No pertinent surgical history.   No Known Allergies     Anesthesia Evaluation     .             ROS/MED HX    ENT/Pulmonary:       Neurologic:     (+)CVA date: 12/2020 with deficits- lower extremity weakness,     Cardiovascular:     (+) hypertension----stent,12/23/20  2 Drug Eluting Stent .. : . CHF etiology: Ischemic, s/p STEMI Last EF: 25% date: 1/1/21 . . :ICD Reason placed:Post STEMI VT  type;The Hitch Settings DDD . .       METS/Exercise Tolerance:     Hematologic:         Musculoskeletal:         GI/Hepatic:        Liver disease: shock liver 2/2 NSTEMI.   Renal/Genitourinary:         Endo:     (+) type II DM Diabetic complications: neuropathy, .      Psychiatric:         Infectious Disease:         Malignancy:         Other:                         PHYSICAL EXAM: "   Mental Status/Neuro: Abnormal Mental Status  Abnormal Mental Status: Somnolent   Airway: Facies: Feasible  Mallampati: II  Mouth/Opening: Full  TM distance: > 6 cm  Neck ROM: Full   Respiratory: Auscultation: CTAB     Resp. Rate: Normal     Resp. Effort: Normal      CV: Rhythm: Irregular  Edema: None   Comments: Patient is somnolent on examination, but rouses to physical stimulation.  Ecchymoses around recent ICD in left upper chest.  Irregular heart rate, PAC and PVC on telemetry.  Multiple absent teeth, multiple dental caries                     Assessment:   ASA SCORE: 4    H&P: History and physical reviewed and following examination; no interval change.   Smoking Status:  Non-Smoker/Unknown   NPO Status: NPO Appropriate     Plan:   Anes. Type:  General   Pre-Medication: None   Induction:  IV (Standard)   Airway: ETT; Oral   Access/Monitoring: PIV   Maintenance: Balanced     Postop Plan:   Postop Pain: None  Postop Sedation/Airway: Not planned  Disposition: Outpatient     PONV Management:   Adult Risk Factors:, Non-Smoker   Prevention: Ondansetron     CONSENT: Direct conversation   Plan and risks discussed with: Patient   Blood Products: Consent Deferred (Minimal Blood Loss)       Comments for Plan/Consent:  Patient is a 60 year old male admitted to [facility] on 12/22/20 for respiratory failure, found to have STEMI and underwent PCI with BANDAR x2.  Hospitalization remarkable for recurrent VT s/p pacer/ICD, shock liver, IABP (weaned off), and CVA.  Transferred to [facility] for advanced heart failure work up.  Last interrogation today, non dependent, no shocks since implantation.                 Christiano Johnston MD

## 2021-01-04 NOTE — PROGRESS NOTES
"Shift: 1530 - 1930  VS: Temp: 98  F (36.7  C) Temp src: Oral BP: 101/66 Pulse: 88   Resp: 20 SpO2: 95 % O2 Device: Nasal cannula Oxygen Delivery: 4 LPM  Pain: Denies pain.   Neuro: A&Ox4. Anxious. Cooperative with care. Vertigo at baseline, refused PRN Meclizine, reports \"it doesn't work\". Baseline neuropathy in bilateral feet.   Cardiac:   SR. ICD recently placed.   Respiratory: Lung sounds clear on 4LPM NC.   GI/Diet/Appetite: 2gm Na diet, no caffeine. NPO at midnight. Nausea, gave zofran with reduced nausea. Becomes nauseous with movement. , declined food, reports no appetite.   :  Zafar catheter for retention.   LDA's: Left & right PIV, SL.   Skin:   Activity: LLE weakness, generalized weakness.   Tests/Procedures:   Pertinent Labs/Lab Collection:      Plan: Continue w/POC.     "

## 2021-01-04 NOTE — PLAN OF CARE
D: Admitted 1/2 from [facility] post-STEMI s/p PCI to LAD. C/b hypoxic respiratory failure 2/2 to pulmonary edema, LLE weakness with CVA, sustained VT s/p ICD. Hx of type 2 diabetes, HTN, and osteomyelitis.      I: Monitored vitals and assessed pt status.   Changed: NPO at midnight for procedures today   Saline locked PIV x2   PRN:    A: A0x4. VSS. Afebrile. On 4L O2 NC. Zafar catheter in place, see flow sheet for output. Dark whitney colored urine. Blood glucose stable, no correction given. No chest pain. C/o SOB while moving. Repositioned as tolerated, refuses most of time. NPO since midnight. Poor appetite, nausea intermittent. Nausea increases with movement. Incontinent of stool, x1 soft stool. LLE weakness, numbness/tingling baseline BLE. Up with assist of 2, lift. Mepilex on coccyx. Generalized bruising. ICD incision covered, dried drainage.     P: Continue to monitor Pt status and report changes to CARDS 2. Plan for CHRISTOPHE and RHC today 1/4.

## 2021-01-04 NOTE — PROGRESS NOTES
[facility]     Cardiology History and Physical - Cards 2    Date of Admission: 1/2/2021    Assessment & Plan: HVSL    Willy Harrell is a 60 year old male admitted on 1/2/2021. He has PMhx of T2DM with proliferative DR, HTN, osteomyelitis who was transferred to [facility] from [facility] for cardiac intervention for late-presentation of anterior STEMI (12/22/2020) now s/p PCI to LAD/Diag. Initially in cardiogenic shock secondary to ICM with HFrEF, weaned off IABP and pressors by 12/23. Hospital course complicated by hypoxic respiratory failure secondary to pulmonary edema, LLE weakness with scattered acute/subacute ischemic lesion on MRI 12/27, sustained VT s/p ICD 12/31. Transferred to [facility] (1/2/2021) for further management and possible advanced heart failure therapy evaluation.    Plan Today  - CHRISTOPHE to evaluate for cardiac thrombus   - RHC to assess fluid status   - Continue lasix IV 40 mg BID   - F/u neurology recs re: MRI brain. If no concern for bleed, will start on low-intensity heparin (increased risk of thrombus formation with after large anterior wall MI)    CAD with anterior STEMI s/p PCI  Cardiogenic shock (resolved) 2/2 acute decompensated HFrEF from ICM  Sustained VT s/p ICD  Moderate MR  EF 25-40% on 01/03. Overall, he appears to have recovered some LVEF function based on echo results. Still hypervolemic and requiring IV diuresis.   -F/u results of RHC 01/04  -Continue lasix 40 mg IV BID   -BMP BID, strict I/Os, fluid restriction, low salt diet   -Holding BB, ACE or afterload agents for now  -Daily Chest xray given respiratory failure  -Continue ASA and Brillenta, High intensity statin  -ICD interrogated 01/04; no arrhythmia, normal ICD function.    -Will consider starting low-intensity heparin if no concern for brain bleed, as patient is at increased risk of thrombus formation with after large anterior wall MI    Acute hypoxic respiratory  failure secondary to cardiogenic pulmonary edema   Has recurrent pulmonary edema with increased O2 requirement, extubated 12/24 now stable on 4L NC. CXR consistent with pulmonary edema with bilateral pleural effusion. Differential includes evolving pneumonia but this is less likely. Completed a course of ceftriaxone on 12/28.  - Diuresis as above  - O2 support, keep SpO2 > 92%  - Closely monitor I/O, weight, BMP  - Daily chest xray     PAD  Poikilothermia of R foot, per patient this has been his chronic problem. US arterial RLE (12/22) with occlusion of DPA. Patent but with slow flow within PTA. No significant stenosis within the femoral-popliteal segment. US DVT (12/22) with no evidence of DVT in RLE. No pain, intact motor power, and sensation, less concerns for acute limb ischemia (also given negative lactate and CK) and more consistent with PAD with chronic arterial insufficiency.  - Vascular surgery consulted, no surgical intervention at this time    Concern for ischemic CVA   LLE weakness  Vertigo   Developed LLE weakness after hospitalization, unclear etiology. Head MRI initially with multiple foci of small areas of acute or subacute infarction (12/30) but with no abnormal enhancement on contrast MRI in 12/31. Spine MRI with stable small focus of T2 hyperintensity in ventral thoracic spinal cord at superior T4 level.   - Neurology to review MRI results from OSH and advise further   - Holding off anticoagulant for now given that etiology is unclear and no clear source of emboli identified. As discussed above, will consider starting low-intensity heparin to prevent formation of cardiac thrombus in the setting of recent large anterior MI  - CHRISTOPHE to evaluate for cardiac thrombus    - PT/OT consulted  -Meclizine for vertigo     ZAIN secondary to cardiorenal physiology  Baseline Cr ~1, was 3.12 on admission suspected to be secondary to cardiogenic shock/ATN, and cardiorenal physiology. Cr stable at 1.5.  - Diuresis as  above  - Closely monitor I/O, weight, BMP    T2DM with micro and macrovascular complications  Noted to have PDR. HgbA1c 6.2% on 12/22.  - BS AC&HS, target 140-180 mg/dL  - mSSI  - Hypoglycemic protocol    Congestive hepatopathy: Improving, trend LFTs  Concern for GIB: Blood from OG tube on admission at [facility], Hgb stable, no recurrent bleeding. Continue Lansoprazole.  Urinary retention: Zafar placed 12/28  Anxiety: Continue Celexa 20 mg daily     Diet: Cardiac diet with fluid restriction  DVT Prophylaxis: Enoxaparin (Lovenox) SQ  Zafar Catheter: in place, indication: Strict 1-2 Hour I&O;Retention  Code Status: FULL code  Fluids: None  Lines: PIVs    Disposition Plan   Expected discharge: 4 - 7 days, recommended to prior living arrangement once fluid volume status optimized on oral medication and O2 requirement at baseline.    Entered: Kelechi Oliveros MD 01/04/2021, 1:39 PM    Kelechi Oliveros MD  Internal Medicine, PGY-3  ______________________________________________________________________    Subjective:  Nursing notes reviewed. Reports unchanged generalized weakness. No change in vertigo symptoms, denies chest pain or shortness of breath.     Review of Systems    The 10 point Review of Systems is negative other than noted in the HPI or here.    Past Medical History    I have reviewed this patient's medical history and updated it with pertinent information if needed.  Past medical history is as described above.    Past Surgical History   I have reviewed this patient's surgical history and updated it with pertinent information if needed.  History reviewed. No pertinent surgical history.    Social History   I have reviewed this patient's social history and updated it with pertinent information if needed.  Social History     Tobacco Use     Smoking status: None   Substance Use Topics     Alcohol use: None     Drug use: None     Family History   I have reviewed this patient's family history and updated it with pertinent  information if needed. Noncontributory.    Allergies   No Known Allergies    Physical Exam   Vital Signs: Temp: 98.8  F (37.1  C) Temp src: Oral BP: 95/58 Pulse: 89   Resp: 16 SpO2: 96 % O2 Device: Nasal cannula Oxygen Delivery: 4 LPM  Weight: 164 lbs 0 oz  Constitutional: awake, alert, cooperative, no apparent distress  Eyes: Ptosis of left eye (at baseline per patient) with cataracts and non-react pupil. R eye is normal, round and reactive to light, extra ocular muscles intact, sclera clear, conjunctiva normal  Neck: JVP ~14 cm  Respiratory: No increased work of breathing, +bi-basilar crackles, no wheezing  Cardiovascular: Regular rate and rhythm, normal S1 and S2, 3/6 systolic murmur best heard at apex  GI: soft, non-tender, non-distended, normal bowel sounds   Genitounirinary: Zafar catheter  Skin: no bruising, bleeding or worrisome skin lesions   Ext: 1+ lower extremity pitting edema. Poikilothermia of R foot with no pain on active/passive movement, intact sensation and motor function.    Neurologic: Awake, alert. 5/5 strength in bilateral upper extremities, 3+/5 in RLE and 2/5 in LLE. Normal sensation in all extremities.     Data   Data reviewed today: I reviewed all medications, new labs and imaging results over the last 24 hours and discussed as pertinent in assessment and plan.    Recent Labs   Lab 01/04/21  0536 01/03/21  1631 01/03/21  1130 01/03/21  0809 01/02/21  2130   WBC 12.2*  --  14.3*  --  14.4*   HGB 10.8*  --  11.5*  --  11.7*   MCV 99  --  98  --  98     --  201  --  214   INR  --   --   --   --  1.20*    140  --  139 138   POTASSIUM 4.0 4.1  --  4.1 4.2   CHLORIDE 103 102  --  103 101   CO2 32 32  --  29 31   BUN 35* 37*  --  35* 36*   CR 1.49* 1.55*  --  1.52* 1.47*   ANIONGAP 7 6  --  7 6   CAMILLE 8.6 8.7  --  8.9 8.5   * 156*  --  145* 149*   ALBUMIN 2.2* 2.4*  --   --  2.4*   PROTTOTAL 5.6* 5.8*  --   --  6.1*   BILITOTAL 0.8 0.8  --   --  0.8   ALKPHOS 105 115  --   --   116   ALT 81* 92*  --   --  106*   AST 39 51*  --   --  51*

## 2021-01-04 NOTE — PROGRESS NOTES
1600 Dr. Weaver with Cardiology paged with request for Transfer order and updated that Patient's spouse Jaky would like to be updated by Cardiology; Jaky can be reached at [phone].

## 2021-01-04 NOTE — OR NURSING
B/P: 101/58, T: 98.8, P: 79, R: 16, Sa02 94% 4L NC    Pt arrived to 3C pre-op at 1121 for CHRISTOPHE in the OR, pt very lethargic, 4L NC, received Ativan 2mg IVP at 11am for right heart cath that was abhorted for now and to be rescheduled for later today.     Notified Device Nurse Odessa Mix of patient's ICD and planned OR today. Advised to place magnet if cautry used.

## 2021-01-04 NOTE — ANESTHESIA CARE TRANSFER NOTE
Patient: Willy Harrell    Procedure(s):  ECHOCARDIOGRAM, TRANSESOPHAGEAL, INTRAOPERATIVE    Diagnosis: Embolic stroke (H) [I63.9]  Diagnosis Additional Information: No value filed.    Anesthesia Type:   General     Note:  Airway :Face Mask  Patient transferred to:PACU  Comments: Pt denies pain or nausea. Report to RN. Handoff Report: Identifed the Patient, Identified the Reponsible Provider, Reviewed the pertinent medical history, Discussed the surgical course, Reviewed Intra-OP anesthesia mangement and issues during anesthesia, Set expectations for post-procedure period and Allowed opportunity for questions and acknowledgement of understanding      Vitals: (Last set prior to Anesthesia Care Transfer)    CRNA VITALS  1/4/2021 1423 - 1/4/2021 1506      1/4/2021             NIBP:  109/78    Pulse:  98    SpO2:  100 %    Resp Rate (observed):  16                Electronically Signed By: FREDERICK Arvizu CRNA  January 4, 2021  3:06 PM

## 2021-01-04 NOTE — PROGRESS NOTES
01/04/21 0800   Quick Adds   Type of Visit Initial PT Evaluation   Living Environment   People in home spouse   Current Living Arrangements house   Home Accessibility stairs to enter home;stairs within home   Number of Stairs, Main Entrance 2   Stair Railings, Main Entrance none   Number of Stairs, Within Home, Primary   (14)   Stair Railings, Within Home, Primary railing on right side (ascending)   Self-Care   Usual Activity Tolerance fair   Current Activity Tolerance poor   Equipment Currently Used at Home walker, rolling   Activity/Exercise/Self-Care Comment In Aug 2020 could walk 6.8 miles with trekking poles.  Stopped due to infected heel bleeding.  Reports was walking ~ 1.3 miles outdoors in Dec.  Had been walking without AD except outside Sept - Dec.  Has handmade hiking staff, 2 trek poles, and FWW and diabetic shoes with inserts.(does not have these at hospital)   Disability/Function   Fall history within last six months no   General Information   Onset of Illness/Injury or Date of Surgery 01/02/21   Referring Physician Nita Smith MD   Patient/Family Therapy Goals Statement (PT) TCU (prefers this to ARU), then home   Pertinent History of Current Problem (include personal factors and/or comorbidities that impact the POC)  60 year old male admitted on 1/2/2021. He has PMhx of T2DM with proliferative DR, HTN, osteomyelitis who was transferred to [facility] from [facility] for cardiac intervention for late-presentation of anterior STEMI (12/22/2020) now s/p PCI to LAD/Diag. Initially in cardiogenic shock secondary to ICM with HFrEF, weaned off IABP and pressors by 12/23. Hospital course complicated by hypoxic respiratory failure secondary to pulmonary edema, LLE weakness with scattered acute/subacute ischemic lesion on MRI 12/27, sustained VT s/p ICD 12/31. Transferred to [facility] (1/2/2021) for further management and possible advanced heart failure therapy evaluation.   Existing  Precautions/Restrictions pacemaker  (LUE not higher than SH height, no more than 10 lbs x 4 wks)   General Observations reports last fall was ~ 1 yr ago and due to bp med that has been since dc'd and no further issue.  Also reports no restrictions on LE weight bearing.   Cognition   Orientation Status (Cognition) oriented x 4   Affect/Mental Status (Cognition) WFL   Follows Commands (Cognition) WFL   Pain Assessment   Patient Currently in Pain No   Integumentary/Edema   Integumentary/Edema Comments no visible extremity edema, denies scrotal edema   Posture    Posture Forward head position;Protracted shoulders;Kyphosis   Range of Motion (ROM)   ROM Comment B knee flexion to ~ 80 degrees passively, LEs tender, thus unclear true available ROM   Strength   Strength Comments BLE 2-/5 with mobility/moveo   Bed Mobility   Comment (Bed Mobility) mod Ax2 to roll   Transfers   Transfer Safety Comments Not safe to stand yet, not even with EZ stand, LE tenderness unlikely to tolerate standing frame. Total A of OH lift/ sling for bed to moveo   Gait/Stairs (Locomotion)   Comment (Gait/Stairs) Not safe to initiatie   Sensory Examination   Sensory Perception Comments Pt reports constant B numbness/tingling midway up calf.  Reports feet have always been cold and they did US and he has a strong pulse   Muscle Tone   Muscle Tone no deficits were identified   Muscle Tone Comments BLE   Clinical Impression   Criteria for Skilled Therapeutic Intervention yes, treatment indicated   PT Diagnosis (PT) impaired functional mobility   Influenced by the following impairments decreased strength, balance, endurance, posture   Functional limitations due to impairments Total A with transfers, unsafe to stand   Clinical Presentation Evolving/Changing   Clinical Presentation Rationale PMH and clinidal judgment   Clinical Decision Making (Complexity) moderate complexity   Therapy Frequency (PT) 5x/week   Predicted Duration of Therapy Intervention  (days/wks) 4 wks   Planned Therapy Interventions (PT) balance training;bed mobility training;gait training;home exercise program;neuromuscular re-education;patient/family education;postural re-education;ROM (range of motion);stair training;strengthening;stretching;transfer training;wheelchair management/propulsion training   Risk & Benefits of therapy have been explained evaluation/treatment results reviewed;care plan/treatment goals reviewed;risks/benefits reviewed;current/potential barriers reviewed;participants voiced agreement with care plan;participants included;patient   PT Discharge Planning    PT Discharge Recommendation (DC Rec) Transitional Care Facility   PT Rationale for DC Rec to progress transfers, initiate standing and gait when appropriate.  Pt declines ARC at this time.  Reports wife can provide 24/7 supervision and light assistance, but not moderate/heavy physical assistance.  Also reports wife investigating how patient might be able to stay on main level.  Pt has 2 stairs to enter & not sure with [facility] rules will allow ramp access   PT Brief overview of current status  OH lift <> recliner   Total Evaluation Time   Total Evaluation Time (Minutes) 8

## 2021-01-04 NOTE — ANESTHESIA POSTPROCEDURE EVALUATION
Anesthesia POST Procedure Evaluation    Patient: Willy Harrell   MRN:     [id number] Gender:   male   Age:    60 year old :      1960        Preoperative Diagnosis: Embolic stroke (H) [I63.9]   Procedure(s):  ECHOCARDIOGRAM, TRANSESOPHAGEAL, INTRAOPERATIVE   Postop Comments: No value filed.     Anesthesia Type: General       Disposition: Outpatient   Postop Pain Control: Uneventful            Sign Out: Well controlled pain   PONV: No   Neuro/Psych: Uneventful            Sign Out: Acceptable/Baseline neuro status   Airway/Respiratory: Uneventful            Sign Out: Acceptable/Baseline resp. status   CV/Hemodynamics: Uneventful            Sign Out: Acceptable CV status   Other NRE: NONE   DID A NON-ROUTINE EVENT OCCUR? No         Last Anesthesia Record Vitals:  CRNA VITALS  2021 1423 - 2021 1523      2021             NIBP:  109/78    Pulse:  98    SpO2:  100 %    Resp Rate (observed):  16          Last PACU Vitals:  Vitals Value Taken Time   BP 99/63 21 1550   Temp 36.8  C (98.2  F) 21 1504   Pulse 89 21 1555   Resp 20 21 1530   SpO2 95 % 21 1555   Temp src     NIBP 109/78 21 1505   Pulse 98 21 1505   SpO2 100 % 21 1505   Resp     Temp     Ht Rate     Temp 2     Vitals shown include unvalidated device data.      Electronically Signed By: Kelli Dickson MD, 2021, 3:56 PM

## 2021-01-05 ENCOUNTER — APPOINTMENT (OUTPATIENT)
Dept: ULTRASOUND IMAGING | Facility: CLINIC | Age: 61
End: 2021-01-05
Attending: STUDENT IN AN ORGANIZED HEALTH CARE EDUCATION/TRAINING PROGRAM
Payer: MEDICAID

## 2021-01-05 ENCOUNTER — APPOINTMENT (OUTPATIENT)
Dept: PHYSICAL THERAPY | Facility: CLINIC | Age: 61
End: 2021-01-05
Attending: INTERNAL MEDICINE
Payer: MEDICAID

## 2021-01-05 ENCOUNTER — APPOINTMENT (OUTPATIENT)
Dept: GENERAL RADIOLOGY | Facility: CLINIC | Age: 61
End: 2021-01-05
Attending: INTERNAL MEDICINE
Payer: MEDICAID

## 2021-01-05 LAB
ALBUMIN SERPL-MCNC: 2.3 G/DL (ref 3.4–5)
ALBUMIN SERPL-MCNC: 2.4 G/DL (ref 3.4–5)
ALP SERPL-CCNC: 99 U/L (ref 40–150)
ALP SERPL-CCNC: 99 U/L (ref 40–150)
ALT SERPL W P-5'-P-CCNC: 78 U/L (ref 0–70)
ALT SERPL W P-5'-P-CCNC: 81 U/L (ref 0–70)
ANION GAP SERPL CALCULATED.3IONS-SCNC: 6 MMOL/L (ref 3–14)
ANION GAP SERPL CALCULATED.3IONS-SCNC: 9 MMOL/L (ref 3–14)
AST SERPL W P-5'-P-CCNC: 42 U/L (ref 0–45)
AST SERPL W P-5'-P-CCNC: 58 U/L (ref 0–45)
BILIRUB DIRECT SERPL-MCNC: 0.2 MG/DL (ref 0–0.2)
BILIRUB DIRECT SERPL-MCNC: 0.3 MG/DL (ref 0–0.2)
BILIRUB SERPL-MCNC: 0.8 MG/DL (ref 0.2–1.3)
BILIRUB SERPL-MCNC: 1.5 MG/DL (ref 0.2–1.3)
BUN SERPL-MCNC: 32 MG/DL (ref 7–30)
BUN SERPL-MCNC: 34 MG/DL (ref 7–30)
CALCIUM SERPL-MCNC: 8.6 MG/DL (ref 8.5–10.1)
CALCIUM SERPL-MCNC: 8.8 MG/DL (ref 8.5–10.1)
CHLORIDE SERPL-SCNC: 102 MMOL/L (ref 94–109)
CHLORIDE SERPL-SCNC: 102 MMOL/L (ref 94–109)
CO2 SERPL-SCNC: 31 MMOL/L (ref 20–32)
CO2 SERPL-SCNC: 31 MMOL/L (ref 20–32)
CREAT SERPL-MCNC: 1.56 MG/DL (ref 0.66–1.25)
CREAT SERPL-MCNC: 1.57 MG/DL (ref 0.66–1.25)
GFR SERPL CREATININE-BSD FRML MDRD: 47 ML/MIN/{1.73_M2}
GFR SERPL CREATININE-BSD FRML MDRD: 47 ML/MIN/{1.73_M2}
GLUCOSE BLDC GLUCOMTR-MCNC: 121 MG/DL (ref 70–99)
GLUCOSE BLDC GLUCOMTR-MCNC: 145 MG/DL (ref 70–99)
GLUCOSE BLDC GLUCOMTR-MCNC: 145 MG/DL (ref 70–99)
GLUCOSE BLDC GLUCOMTR-MCNC: 155 MG/DL (ref 70–99)
GLUCOSE SERPL-MCNC: 129 MG/DL (ref 70–99)
GLUCOSE SERPL-MCNC: 152 MG/DL (ref 70–99)
PLATELET # BLD AUTO: 193 10E9/L (ref 150–450)
POTASSIUM SERPL-SCNC: 3.8 MMOL/L (ref 3.4–5.3)
POTASSIUM SERPL-SCNC: 4.1 MMOL/L (ref 3.4–5.3)
PROT SERPL-MCNC: 5.7 G/DL (ref 6.8–8.8)
PROT SERPL-MCNC: 6 G/DL (ref 6.8–8.8)
SODIUM SERPL-SCNC: 139 MMOL/L (ref 133–144)
SODIUM SERPL-SCNC: 142 MMOL/L (ref 133–144)
UFH PPP CHRO-ACNC: 0.24 IU/ML
UFH PPP CHRO-ACNC: 0.37 IU/ML
UFH PPP CHRO-ACNC: 0.69 IU/ML

## 2021-01-05 PROCEDURE — 85049 AUTOMATED PLATELET COUNT: CPT | Performed by: INTERNAL MEDICINE

## 2021-01-05 PROCEDURE — 250N000013 HC RX MED GY IP 250 OP 250 PS 637: Performed by: STUDENT IN AN ORGANIZED HEALTH CARE EDUCATION/TRAINING PROGRAM

## 2021-01-05 PROCEDURE — 250N000013 HC RX MED GY IP 250 OP 250 PS 637: Performed by: INTERNAL MEDICINE

## 2021-01-05 PROCEDURE — 80048 BASIC METABOLIC PNL TOTAL CA: CPT | Performed by: INTERNAL MEDICINE

## 2021-01-05 PROCEDURE — 214N000001 HC R&B CCU UMMC

## 2021-01-05 PROCEDURE — 99233 SBSQ HOSP IP/OBS HIGH 50: CPT | Mod: GC | Performed by: INTERNAL MEDICINE

## 2021-01-05 PROCEDURE — 999N001017 HC STATISTIC GLUCOSE BY METER IP

## 2021-01-05 PROCEDURE — 250N000011 HC RX IP 250 OP 636: Performed by: STUDENT IN AN ORGANIZED HEALTH CARE EDUCATION/TRAINING PROGRAM

## 2021-01-05 PROCEDURE — 93880 EXTRACRANIAL BILAT STUDY: CPT

## 2021-01-05 PROCEDURE — 250N000009 HC RX 250: Performed by: INTERNAL MEDICINE

## 2021-01-05 PROCEDURE — 4A023N6 MEASUREMENT OF CARDIAC SAMPLING AND PRESSURE, RIGHT HEART, PERCUTANEOUS APPROACH: ICD-10-PCS | Performed by: INTERNAL MEDICINE

## 2021-01-05 PROCEDURE — 85520 HEPARIN ASSAY: CPT

## 2021-01-05 PROCEDURE — 71045 X-RAY EXAM CHEST 1 VIEW: CPT

## 2021-01-05 PROCEDURE — 97110 THERAPEUTIC EXERCISES: CPT | Mod: GP | Performed by: REHABILITATION PRACTITIONER

## 2021-01-05 PROCEDURE — 93451 RIGHT HEART CATH: CPT | Mod: 26 | Performed by: INTERNAL MEDICINE

## 2021-01-05 PROCEDURE — 36415 COLL VENOUS BLD VENIPUNCTURE: CPT

## 2021-01-05 PROCEDURE — 85520 HEPARIN ASSAY: CPT | Performed by: INTERNAL MEDICINE

## 2021-01-05 PROCEDURE — 71045 X-RAY EXAM CHEST 1 VIEW: CPT | Mod: 26 | Performed by: RADIOLOGY

## 2021-01-05 PROCEDURE — 93880 EXTRACRANIAL BILAT STUDY: CPT | Mod: 26 | Performed by: RADIOLOGY

## 2021-01-05 PROCEDURE — 36415 COLL VENOUS BLD VENIPUNCTURE: CPT | Performed by: INTERNAL MEDICINE

## 2021-01-05 PROCEDURE — 80076 HEPATIC FUNCTION PANEL: CPT | Performed by: INTERNAL MEDICINE

## 2021-01-05 PROCEDURE — 93451 RIGHT HEART CATH: CPT | Performed by: INTERNAL MEDICINE

## 2021-01-05 PROCEDURE — 97530 THERAPEUTIC ACTIVITIES: CPT | Mod: GP | Performed by: REHABILITATION PRACTITIONER

## 2021-01-05 PROCEDURE — 272N000001 HC OR GENERAL SUPPLY STERILE: Performed by: INTERNAL MEDICINE

## 2021-01-05 RX ORDER — POTASSIUM CHLORIDE 1.5 G/1.58G
40 POWDER, FOR SOLUTION ORAL ONCE
Status: COMPLETED | OUTPATIENT
Start: 2021-01-05 | End: 2021-01-05

## 2021-01-05 RX ORDER — FUROSEMIDE 10 MG/ML
40 INJECTION INTRAMUSCULAR; INTRAVENOUS 2 TIMES DAILY
Status: DISCONTINUED | OUTPATIENT
Start: 2021-01-05 | End: 2021-01-05

## 2021-01-05 RX ORDER — FUROSEMIDE 10 MG/ML
80 INJECTION INTRAMUSCULAR; INTRAVENOUS 2 TIMES DAILY
Status: DISCONTINUED | OUTPATIENT
Start: 2021-01-05 | End: 2021-01-05

## 2021-01-05 RX ORDER — POTASSIUM CHLORIDE 750 MG/1
40 TABLET, EXTENDED RELEASE ORAL ONCE
Status: DISCONTINUED | OUTPATIENT
Start: 2021-01-05 | End: 2021-01-05

## 2021-01-05 RX ORDER — DIGOXIN 125 MCG
125 TABLET ORAL DAILY
Status: DISCONTINUED | OUTPATIENT
Start: 2021-01-05 | End: 2021-01-12 | Stop reason: HOSPADM

## 2021-01-05 RX ORDER — DIGOXIN 125 MCG
125 TABLET ORAL DAILY
Status: DISCONTINUED | OUTPATIENT
Start: 2021-01-05 | End: 2021-01-05

## 2021-01-05 RX ORDER — FUROSEMIDE 10 MG/ML
40 INJECTION INTRAMUSCULAR; INTRAVENOUS
Status: COMPLETED | OUTPATIENT
Start: 2021-01-05 | End: 2021-01-05

## 2021-01-05 RX ADMIN — TICAGRELOR 90 MG: 90 TABLET ORAL at 08:06

## 2021-01-05 RX ADMIN — DIGOXIN 125 MCG: 125 TABLET ORAL at 16:39

## 2021-01-05 RX ADMIN — ASPIRIN 81 MG CHEWABLE TABLET 81 MG: 81 TABLET CHEWABLE at 08:05

## 2021-01-05 RX ADMIN — ATORVASTATIN CALCIUM 40 MG: 40 TABLET, FILM COATED ORAL at 19:07

## 2021-01-05 RX ADMIN — LANSOPRAZOLE 30 MG: 30 TABLET, ORALLY DISINTEGRATING, DELAYED RELEASE ORAL at 08:05

## 2021-01-05 RX ADMIN — POTASSIUM CHLORIDE 40 MEQ: 1.5 POWDER, FOR SOLUTION ORAL at 19:13

## 2021-01-05 RX ADMIN — HEPARIN SODIUM 900 UNITS/HR: 10000 INJECTION, SOLUTION INTRAVENOUS at 16:32

## 2021-01-05 RX ADMIN — CITALOPRAM HYDROBROMIDE 20 MG: 20 TABLET ORAL at 08:05

## 2021-01-05 RX ADMIN — FUROSEMIDE 40 MG: 10 INJECTION, SOLUTION INTRAVENOUS at 16:33

## 2021-01-05 RX ADMIN — TICAGRELOR 90 MG: 90 TABLET ORAL at 19:07

## 2021-01-05 RX ADMIN — LANSOPRAZOLE 30 MG: 30 TABLET, ORALLY DISINTEGRATING, DELAYED RELEASE ORAL at 19:07

## 2021-01-05 RX ADMIN — Medication 6.25 MG: at 19:07

## 2021-01-05 RX ADMIN — FUROSEMIDE 40 MG: 10 INJECTION, SOLUTION INTRAVENOUS at 08:29

## 2021-01-05 ASSESSMENT — ACTIVITIES OF DAILY LIVING (ADL)
ADLS_ACUITY_SCORE: 22

## 2021-01-05 ASSESSMENT — MIFFLIN-ST. JEOR: SCORE: 1657.5

## 2021-01-05 NOTE — PROGRESS NOTES
[facility]     Cardiology History and Physical - Cards 2    Date of Admission: 1/2/2021    Assessment & Plan: HVSL    Willy Harrell is a 60 year old male admitted on 1/2/2021. He has PMhx of T2DM with proliferative DR, HTN, osteomyelitis who was transferred to [facility] from [facility] for cardiac intervention for late-presentation of anterior STEMI (12/22/2020) now s/p PCI to LAD/Diag. Initially in cardiogenic shock secondary to ICM with HFrEF, weaned off IABP and pressors by 12/23. Hospital course complicated by hypoxic respiratory failure secondary to pulmonary edema, LLE weakness with scattered acute/subacute ischemic lesion on MRI 12/27, sustained VT s/p ICD 12/31. Transferred to [facility] (1/2/2021) for further management and possible advanced heart failure therapy evaluation.    Plan Today  - RHC: RA 9, PA 49/20 (30), PCWP 20, RV 50 50/6 (9)  - Continue intermittent lasix IV to keep net even  - Start captopril 6.25 mg TID and digoxin 0.125 mcg daily  - Bilateral carotid US     CAD with anterior STEMI, s/p DESx2 to prox and mid LAD + POBA of large D2 12/22/2020  Cardiogenic shock (resolved) 2/2 acute decompensated HFrEF from ICM  Sustained VT s/p ICD  Moderate MR  EF 25-40% on 01/03. Overall, he appears to have recovered some LVEF function based on echo results.  -01/05 RHC: RA 9, PA 49/20 (30), PCWP 20, RV 50 50/6 (9)  - Start captopril 6.25 mg TID and digoxin 0.125 mcg daily  -Continue intermittent lasix IV to keep net even  -Holding BB and spironolactone for now   -BMPs, strict I/Os, fluid restriction, low salt diet   -Continue ASA, Ticagrelor and Atorvastatin   -ICD interrogated 01/04; no arrhythmia, normal ICD function   -Continue low-intensity heparin as patient is at increased risk of thrombus formation after large anterior wall MI    Acute hypoxic respiratory failure secondary to cardiogenic pulmonary edema   Has recurrent pulmonary edema  with increased O2 requirement, extubated 12/24 now stable on 3-4L NC. CXR consistent with pulmonary edema with bilateral pleural effusion. Differential includes evolving pneumonia but this is less likely. Completed a course of ceftriaxone on 12/28.  - Diuresis as above  - O2 support, keep SpO2 > 92%  - Closely monitor I/O, weight, BMP  - Daily chest xray     PAD  Poikilothermia of R foot, per patient this has been his chronic problem. US arterial RLE (12/22) with occlusion of DPA. Patent but with slow flow within PTA. No significant stenosis within the femoral-popliteal segment. US DVT (12/22) with no evidence of DVT in RLE. No pain, intact motor power, and sensation, less concerns for acute limb ischemia (also given negative lactate and CK) and more consistent with PAD with chronic arterial insufficiency.  - Vascular surgery consulted, no surgical intervention at this time    Acute/subacute infarcts  LLE weakness  Vertigo   Developed LLE weakness after hospitalization, unclear etiology. 12/30 MRI brain (w/o contrast) at [facility] showed small foci of FLAIR hyperintensity and restricted diffusion within the anterior right frontal lobe, lateral and inferior left frontal lobe, left periatrial white matter, posterior right parietal lobe, left occipital lobe, left temporal periventricular white matter and left cerebellum, compatible with small areas of acute or subacute infarct. MRI brain w/ contrast the following day (12/31) showed no abnormal enhancement, although radiologist noted that the infarcts were better evaluated on the non-contrast MRI. Evaluated by neurology upon transfer here, deemed to be optimized from medical standpoint. He's already on DAPT and atorvastatin after recent coronary stents. 01/04 CHRISTOPHE negative for thrombus.   - Neurology recommending CTA head and neck to complete stroke workup. Will hold off pending improvement in renal function  - Will obtain b/l carotid ultrasound 01/05  -  PT/OT  -Meclizine for vertigo     ZAIN secondary to cardiorenal physiology  Baseline Cr ~1, was 3.12 on admission suspected to be secondary to cardiogenic shock/ATN, and cardiorenal physiology. Cr stable ~1.5.  - Heart failure management as above  - Closely monitor I/O, weight, BMP    T2DM with micro and macrovascular complications  Noted to have PDR. HgbA1c 6.2% on 12/22.  - BS AC&HS, target 140-180 mg/dL  - mSSI  - Hypoglycemia protocol    Congestive hepatopathy: Improving, trend LFTs  Concern for GIB: Blood from OG tube on admission at [facility], Hgb stable, no recurrent bleeding. Continue Lansoprazole.  Urinary retention: Zafar placed 12/28  Anxiety: Continue Celexa 20 mg daily     Diet: Cardiac diet with fluid restriction  DVT Prophylaxis: Enoxaparin (Lovenox) SQ  Zafar Catheter: in place, indication: Strict 1-2 Hour I&O  Code Status: FULL code  Fluids: None  Lines: PIVs    Disposition Plan   Expected discharge: 4 - 7 days, recommended to prior living arrangement once fluid volume status optimized on oral medication and O2 requirement at baseline.    Entered: Kelechi Oliveros MD 01/05/2021, 7:36 AM    Kelechi Oliveros MD  Internal Medicine, PGY-3  ______________________________________________________________________    Subjective:  Nursing notes reviewed. Had episodes of nausea and vomiting yesterday afternoon, somewhat alleviated by IV zofran. Denies chest pain or shortness of breath this AM. Still feels weak overall. Denies dizziness (has not been taking meclizine)    Review of Systems    The 4 point Review of Systems is negative other than noted in the HPI or here.    Past Medical History    I have reviewed this patient's medical history and updated it with pertinent information if needed.  Past medical history is as described above.    Past Surgical History   I have reviewed this patient's surgical history and updated it with pertinent information if needed.  History reviewed. No pertinent surgical  history.    Social History   I have reviewed this patient's social history and updated it with pertinent information if needed.  Social History     Tobacco Use     Smoking status: None   Substance Use Topics     Alcohol use: None     Drug use: None     Family History   I have reviewed this patient's family history and updated it with pertinent information if needed. Noncontributory.    Allergies   No Known Allergies    Physical Exam   Vital Signs: Temp: 98.2  F (36.8  C) Temp src: Axillary BP: 106/56 Pulse: 86   Resp: 18 SpO2: 93 % O2 Device: Nasal cannula Oxygen Delivery: 3 LPM  Weight: 165 lbs 9.05 oz  Constitutional: awake, alert, cooperative, no apparent distress  Eyes: Ptosis of left eye (at baseline per patient) with cataracts and non-react pupil. R eye is normal, round and reactive to light, extra ocular muscles intact, sclera clear, conjunctiva normal  Neck: JVP ~10 cm  Respiratory: No increased work of breathing, +bi-basilar crackles, no wheezing  Cardiovascular: Regular rate and rhythm, normal S1 and S2, 3/6 systolic murmur best heard at apex  GI: soft, non-tender, non-distended, normal bowel sounds   Genitounirinary: Zafar catheter  Skin: no bruising, bleeding or worrisome skin lesions   Ext: No lower extremity pitting edema. Poikilothermia of R foot with no pain on active/passive movement, intact sensation and motor function.    Neurologic: Awake, alert. 5/5 strength in bilateral upper extremities, 3+/5 in RLE and 2/5 in LLE. Normal sensation in all extremities.     Data   Data reviewed today: I reviewed all medications, new labs and imaging results over the last 24 hours and discussed as pertinent in assessment and plan.    Recent Labs   Lab 01/05/21  0513 01/04/21  2310 01/04/21  1711 01/04/21  0536 01/03/21  1130 01/03/21  1130 01/02/21  2130 01/02/21  2130   WBC  --   --  9.3 12.2*  --  14.3*  --  14.4*   HGB  --   --  10.5* 10.8*  --  11.5*  --  11.7*   MCV  --   --  101* 99  --  98  --  98   PLT  193  --  202 202  --  201  --  214   INR  --   --   --   --   --   --   --  1.20*    141 140 141   < >  --    < > 138   POTASSIUM 4.1 4.0 4.0 4.0   < >  --    < > 4.2   CHLORIDE 102 102 103 103   < >  --    < > 101   CO2 31 33* 31 32   < >  --    < > 31   BUN 34* 35* 34* 35*   < >  --    < > 36*   CR 1.57* 1.57* 1.53* 1.49*   < >  --    < > 1.47*   ANIONGAP 6 6 6 7   < >  --    < > 6   CAMILLE 8.6 8.6 8.5 8.6   < >  --    < > 8.5   * 184* 124* 136*   < >  --    < > 149*   ALBUMIN 2.3*  --  2.2* 2.2*   < >  --   --  2.4*   PROTTOTAL 5.7*  --  5.8* 5.6*   < >  --   --  6.1*   BILITOTAL 0.8  --  0.7 0.8   < >  --   --  0.8   ALKPHOS 99  --  92 105   < >  --   --  116   ALT 81*  --  72* 81*   < >  --   --  106*   AST 58*  --  34 39   < >  --   --  51*    < > = values in this interval not displayed.

## 2021-01-05 NOTE — PLAN OF CARE
VSS on 3L NC. Carotid US and RHC today, RIJ site wnl. Heparin infusing 750u/hr. No BM this shift. IV lasix x1 and Voiding adequately via armenta. Continues with intermittent nausea with movement. Will continue  poc

## 2021-01-05 NOTE — PLAN OF CARE
"/56 (BP Location: Right arm)   Pulse 86   Temp 98.2  F (36.8  C) (Axillary)   Resp 18   Ht 1.93 m (6' 4\")   Wt 75.1 kg (165 lb 9.1 oz)   SpO2 93%   BMI 20.15 kg/m      D: Admitted 1/2 from [facility] for STEMI s/p PCI to LAD. C/b hypoxic respiratory failure 2/2 to pulmonary edema, LLE weakness with CVA, sustained VT s/p ICD. Hx of type 2 diabetes, HTN, and osteomyelitis.   I/A: Patient is A&OX4, on 3L NC with capnography monitoring during the night.  He received heparin bolus 2250units/hr and rate now at 1050units/hr and next 10a next at 0645.  Patient has tingling on this fingers d/t frost bite when he was 15-yrs old.  Patient has baseline numbness and tingling in LLE and also has L-eye blindness.  Patient had X1 BM, attends changed, pericare done and reposition and turning when encouraged sometimes patient declines.  Patient slept between cares.  P: Will continue to monitor and notify MD of status changes.      "

## 2021-01-05 NOTE — PLAN OF CARE
"/67 (BP Location: Right arm)   Pulse 95   Temp 98.9  F (37.2  C) (Axillary)   Resp 18   Ht 1.93 m (6' 4\")   Wt 74.4 kg (164 lb)   SpO2 94%   BMI 19.96 kg/m      Status: CHRISTOPHE and Echo in OR with intubation today r/t patient tolerance. ICD placed on L side of chest  Vitals: vitals within parameters. Sinus rhythm with frequent PVCs and rare PACs.  Neuros: Drowsy after procedure. Alert and oriented x4 this evening. Pt reports numbness/tingling in BLE and bilateral fingertips at baseline. Strengths 2/5 LLE, 3/5 RLE, 4/5 BUE. L pupil fixed at baseline--blindness  IV: PIV infusing hep gtt. Check hep 10a level at 2330.  Labs/Electrolytes: No replacements needed  Resp/trach: crackles noted in lung fields on 4L nasal cannula. Capno monitoring continues 24hr post procedure.   Diet: 2g Na with 1800 fluid restriction, requests crushed pills with applesauce for medication. Reports no difficulty swallowing but prefers crushed medications.  Bowel status: Incontinent. Had 1 loose stool earlier this evening. Stool softener held.  : armenta in place with adequate output  Skin: Blanchable redness noted on back. Turning encouraged. Bruising noted. Steri strips intact over ICD site. Slight redness noted around daniel area from incontinence. No new concerns noted  Pain: Reported being uncomfortable and L thigh aching. Repositioned with good effect. Repo q 2 hrs with pillow support.  Activity: assist of 2 with lift, pt reports vertigo when moving from side to side. Pt unable to help with turns  Plan: continue plan of care and update CARDS 2 with changes.    "

## 2021-01-06 ENCOUNTER — APPOINTMENT (OUTPATIENT)
Dept: PHYSICAL THERAPY | Facility: CLINIC | Age: 61
End: 2021-01-06
Attending: INTERNAL MEDICINE
Payer: MEDICAID

## 2021-01-06 ENCOUNTER — APPOINTMENT (OUTPATIENT)
Dept: GENERAL RADIOLOGY | Facility: CLINIC | Age: 61
End: 2021-01-06
Attending: INTERNAL MEDICINE
Payer: MEDICAID

## 2021-01-06 ENCOUNTER — APPOINTMENT (OUTPATIENT)
Dept: OCCUPATIONAL THERAPY | Facility: CLINIC | Age: 61
End: 2021-01-06
Attending: STUDENT IN AN ORGANIZED HEALTH CARE EDUCATION/TRAINING PROGRAM
Payer: MEDICAID

## 2021-01-06 LAB
ALBUMIN SERPL-MCNC: 2.4 G/DL (ref 3.4–5)
ALBUMIN SERPL-MCNC: 2.5 G/DL (ref 3.4–5)
ALP SERPL-CCNC: 101 U/L (ref 40–150)
ALP SERPL-CCNC: 99 U/L (ref 40–150)
ALT SERPL W P-5'-P-CCNC: 81 U/L (ref 0–70)
ALT SERPL W P-5'-P-CCNC: 88 U/L (ref 0–70)
ANION GAP SERPL CALCULATED.3IONS-SCNC: 5 MMOL/L (ref 3–14)
ANION GAP SERPL CALCULATED.3IONS-SCNC: 7 MMOL/L (ref 3–14)
AST SERPL W P-5'-P-CCNC: 44 U/L (ref 0–45)
AST SERPL W P-5'-P-CCNC: 50 U/L (ref 0–45)
BILIRUB DIRECT SERPL-MCNC: 0.3 MG/DL (ref 0–0.2)
BILIRUB DIRECT SERPL-MCNC: 0.3 MG/DL (ref 0–0.2)
BILIRUB SERPL-MCNC: 0.9 MG/DL (ref 0.2–1.3)
BILIRUB SERPL-MCNC: 1.6 MG/DL (ref 0.2–1.3)
BUN SERPL-MCNC: 30 MG/DL (ref 7–30)
BUN SERPL-MCNC: 31 MG/DL (ref 7–30)
CALCIUM SERPL-MCNC: 8.8 MG/DL (ref 8.5–10.1)
CALCIUM SERPL-MCNC: 8.8 MG/DL (ref 8.5–10.1)
CHLORIDE SERPL-SCNC: 101 MMOL/L (ref 94–109)
CHLORIDE SERPL-SCNC: 102 MMOL/L (ref 94–109)
CO2 SERPL-SCNC: 32 MMOL/L (ref 20–32)
CO2 SERPL-SCNC: 33 MMOL/L (ref 20–32)
CREAT SERPL-MCNC: 1.38 MG/DL (ref 0.66–1.25)
CREAT SERPL-MCNC: 1.43 MG/DL (ref 0.66–1.25)
ERYTHROCYTE [DISTWIDTH] IN BLOOD BY AUTOMATED COUNT: 14.1 % (ref 10–15)
GFR SERPL CREATININE-BSD FRML MDRD: 52 ML/MIN/{1.73_M2}
GFR SERPL CREATININE-BSD FRML MDRD: 55 ML/MIN/{1.73_M2}
GLUCOSE BLDC GLUCOMTR-MCNC: 128 MG/DL (ref 70–99)
GLUCOSE BLDC GLUCOMTR-MCNC: 138 MG/DL (ref 70–99)
GLUCOSE BLDC GLUCOMTR-MCNC: 155 MG/DL (ref 70–99)
GLUCOSE BLDC GLUCOMTR-MCNC: 169 MG/DL (ref 70–99)
GLUCOSE BLDC GLUCOMTR-MCNC: 208 MG/DL (ref 70–99)
GLUCOSE SERPL-MCNC: 133 MG/DL (ref 70–99)
GLUCOSE SERPL-MCNC: 162 MG/DL (ref 70–99)
HCT VFR BLD AUTO: 35.4 % (ref 40–53)
HGB BLD-MCNC: 10.9 G/DL (ref 13.3–17.7)
MAGNESIUM SERPL-MCNC: 2.2 MG/DL (ref 1.6–2.3)
MCH RBC QN AUTO: 30.8 PG (ref 26.5–33)
MCHC RBC AUTO-ENTMCNC: 30.8 G/DL (ref 31.5–36.5)
MCV RBC AUTO: 100 FL (ref 78–100)
PLATELET # BLD AUTO: 197 10E9/L (ref 150–450)
POTASSIUM SERPL-SCNC: 3.9 MMOL/L (ref 3.4–5.3)
POTASSIUM SERPL-SCNC: 4 MMOL/L (ref 3.4–5.3)
PROT SERPL-MCNC: 6.1 G/DL (ref 6.8–8.8)
PROT SERPL-MCNC: 6.1 G/DL (ref 6.8–8.8)
RBC # BLD AUTO: 3.54 10E12/L (ref 4.4–5.9)
SODIUM SERPL-SCNC: 138 MMOL/L (ref 133–144)
SODIUM SERPL-SCNC: 140 MMOL/L (ref 133–144)
WBC # BLD AUTO: 9.2 10E9/L (ref 4–11)

## 2021-01-06 PROCEDURE — 71045 X-RAY EXAM CHEST 1 VIEW: CPT

## 2021-01-06 PROCEDURE — 250N000013 HC RX MED GY IP 250 OP 250 PS 637: Performed by: STUDENT IN AN ORGANIZED HEALTH CARE EDUCATION/TRAINING PROGRAM

## 2021-01-06 PROCEDURE — 97110 THERAPEUTIC EXERCISES: CPT | Mod: GP | Performed by: PHYSICAL THERAPIST

## 2021-01-06 PROCEDURE — 80048 BASIC METABOLIC PNL TOTAL CA: CPT | Performed by: INTERNAL MEDICINE

## 2021-01-06 PROCEDURE — 250N000013 HC RX MED GY IP 250 OP 250 PS 637: Performed by: INTERNAL MEDICINE

## 2021-01-06 PROCEDURE — 97530 THERAPEUTIC ACTIVITIES: CPT | Mod: GP | Performed by: PHYSICAL THERAPIST

## 2021-01-06 PROCEDURE — 36415 COLL VENOUS BLD VENIPUNCTURE: CPT | Performed by: INTERNAL MEDICINE

## 2021-01-06 PROCEDURE — 85027 COMPLETE CBC AUTOMATED: CPT | Performed by: INTERNAL MEDICINE

## 2021-01-06 PROCEDURE — 80076 HEPATIC FUNCTION PANEL: CPT | Performed by: INTERNAL MEDICINE

## 2021-01-06 PROCEDURE — 97530 THERAPEUTIC ACTIVITIES: CPT | Mod: GO

## 2021-01-06 PROCEDURE — 99233 SBSQ HOSP IP/OBS HIGH 50: CPT | Mod: GC | Performed by: INTERNAL MEDICINE

## 2021-01-06 PROCEDURE — 71045 X-RAY EXAM CHEST 1 VIEW: CPT | Mod: 26 | Performed by: RADIOLOGY

## 2021-01-06 PROCEDURE — 97165 OT EVAL LOW COMPLEX 30 MIN: CPT | Mod: GO

## 2021-01-06 PROCEDURE — 83735 ASSAY OF MAGNESIUM: CPT | Performed by: INTERNAL MEDICINE

## 2021-01-06 PROCEDURE — 999N001017 HC STATISTIC GLUCOSE BY METER IP

## 2021-01-06 PROCEDURE — 214N000001 HC R&B CCU UMMC

## 2021-01-06 RX ORDER — PROCHLORPERAZINE MALEATE 5 MG
10 TABLET ORAL EVERY 6 HOURS PRN
Status: DISCONTINUED | OUTPATIENT
Start: 2021-01-06 | End: 2021-01-12 | Stop reason: HOSPADM

## 2021-01-06 RX ORDER — FUROSEMIDE 40 MG
40 TABLET ORAL
Status: DISCONTINUED | OUTPATIENT
Start: 2021-01-06 | End: 2021-01-10

## 2021-01-06 RX ORDER — PROCHLORPERAZINE 25 MG
25 SUPPOSITORY, RECTAL RECTAL EVERY 12 HOURS PRN
Status: DISCONTINUED | OUTPATIENT
Start: 2021-01-06 | End: 2021-01-12 | Stop reason: HOSPADM

## 2021-01-06 RX ORDER — POTASSIUM CHLORIDE 1.5 G/1.58G
20 POWDER, FOR SOLUTION ORAL ONCE
Status: COMPLETED | OUTPATIENT
Start: 2021-01-06 | End: 2021-01-06

## 2021-01-06 RX ADMIN — ASPIRIN 81 MG CHEWABLE TABLET 81 MG: 81 TABLET CHEWABLE at 09:29

## 2021-01-06 RX ADMIN — CITALOPRAM HYDROBROMIDE 20 MG: 20 TABLET ORAL at 09:29

## 2021-01-06 RX ADMIN — ATORVASTATIN CALCIUM 40 MG: 40 TABLET, FILM COATED ORAL at 20:40

## 2021-01-06 RX ADMIN — POTASSIUM CHLORIDE 20 MEQ: 1.5 POWDER, FOR SOLUTION ORAL at 09:28

## 2021-01-06 RX ADMIN — LANSOPRAZOLE 30 MG: 30 TABLET, ORALLY DISINTEGRATING, DELAYED RELEASE ORAL at 09:37

## 2021-01-06 RX ADMIN — Medication 6.25 MG: at 09:30

## 2021-01-06 RX ADMIN — FUROSEMIDE 40 MG: 40 TABLET ORAL at 09:28

## 2021-01-06 RX ADMIN — Medication 6.25 MG: at 20:40

## 2021-01-06 RX ADMIN — TICAGRELOR 90 MG: 90 TABLET ORAL at 09:29

## 2021-01-06 RX ADMIN — FUROSEMIDE 40 MG: 40 TABLET ORAL at 15:39

## 2021-01-06 RX ADMIN — DIGOXIN 125 MCG: 125 TABLET ORAL at 09:30

## 2021-01-06 RX ADMIN — TICAGRELOR 90 MG: 90 TABLET ORAL at 20:40

## 2021-01-06 RX ADMIN — LANSOPRAZOLE 30 MG: 30 TABLET, ORALLY DISINTEGRATING, DELAYED RELEASE ORAL at 20:41

## 2021-01-06 RX ADMIN — Medication 6.25 MG: at 15:38

## 2021-01-06 ASSESSMENT — ACTIVITIES OF DAILY LIVING (ADL)
ADLS_ACUITY_SCORE: 24
DEPENDENT_IADLS:: INDEPENDENT
ADLS_ACUITY_SCORE: 24
ADLS_ACUITY_SCORE: 22
ADLS_ACUITY_SCORE: 24

## 2021-01-06 NOTE — PROGRESS NOTES
[facility]     Cardiology History and Physical - Cards 2    Date of Admission: 1/2/2021    Assessment & Plan: HVSL    Willy Harrell is a 60 year old male admitted on 1/2/2021. He has PMhx of T2DM with proliferative DR, HTN, osteomyelitis who was transferred to [facility] from [facility] for cardiac intervention for late-presentation of anterior STEMI (12/22/2020) now s/p PCI to LAD/Diag. Initially in cardiogenic shock secondary to ICM with HFrEF, weaned off IABP and pressors by 12/23. Hospital course complicated by hypoxic respiratory failure secondary to pulmonary edema, LLE weakness with scattered acute/subacute ischemic lesion on MRI 12/27, sustained VT s/p ICD 12/31. Transferred to [facility] (1/2/2021) for further management and possible advanced heart failure therapy evaluation.    Plan Today  - Switch from IV lasix to PO 40 mg BID, I/O goal net even   - Continue captopril 6.25 TID and digoxin 125 mcg. Will likely increase dose of captopril tomorrow if BP and renal function permit  - Will curbside neurology re: vertigo     CAD with anterior STEMI, s/p DESx2 to prox and mid LAD + POBA of large D2 12/22/2020  Cardiogenic shock (resolved) 2/2 acute decompensated HFrEF from ICM  Sustained VT s/p ICD  Moderate MR  EF 25-40% on 01/03. Overall, he appears to have recovered some LVEF function based on echo results. 01/05 RHC: RA 9, PA 49/20 (30), PCWP 20, RV 50 50/6 (9). Working on optimizing medical therapy. If this does not result in significant improvement in cardiac function, will consider inotrope and advance heart failure therapies.   - Continue captopril 6.25 mg TID and digoxin 0.125 mcg daily. Will likely increase dose of captopril tomorrow if BP and renal function permit  - Switch from IV lasix to PO 40 mg BID, I/O goal net even   - Holding BB and spironolactone for now   - BMPs, strict I/Os, fluid restriction, low salt diet   - Continue ASA,  Ticagrelor and Atorvastatin   - ICD interrogated 01/04; no arrhythmia, normal ICD function   - Continue low-intensity heparin as patient is at increased risk of thrombus formation after large anterior wall MI    Acute hypoxic respiratory failure secondary to cardiogenic pulmonary edema   Has recurrent pulmonary edema with increased O2 requirement, extubated 12/24 now stable on 3-4L NC. CXR consistent with pulmonary edema with bilateral pleural effusion. Differential includes evolving pneumonia but this is less likely. Completed a course of ceftriaxone on 12/28.  - Diuresis as above  - O2 support, keep SpO2 > 92%  - Closely monitor I/O, weight, BMP    Acute/subacute infarcts  LLE weakness  Vertigo   Developed LLE weakness after hospitalization, unclear etiology. 12/30 MRI brain (w/o contrast) at [facility] showed small foci of FLAIR hyperintensity and restricted diffusion within the anterior right frontal lobe, lateral and inferior left frontal lobe, left periatrial white matter, posterior right parietal lobe, left occipital lobe, left temporal periventricular white matter and left cerebellum, compatible with small areas of acute or subacute infarct. MRI brain w/ contrast the following day (12/31) showed no abnormal enhancement, although radiologist noted that the infarcts were better evaluated on the non-contrast MRI. Evaluated by neurology upon transfer here, deemed to be optimized from medical standpoint. He's already on DAPT and atorvastatin after recent coronary stents. 01/04 CHRISTOPHE negative for thrombus. Bilateral carotid ultrasound showed no significant stenoses.   - Neurology recommending CTA head and neck to complete stroke workup. Holding off pending improvement in renal function  - PT/OT following  - Will curbside neurology re: vertigo  - Follow-up with local neurologist ~02/08-02/15 for further evaluation per recommendation of inpatient neurology team    ZAIN secondary to cardiorenal  physiology  Baseline Cr ~1, was 3.12 on admission suspected to be secondary to cardiogenic shock/ATN, and cardiorenal physiology. Cr stable ~1.5.  - Heart failure management as above  - Closely monitor I/O, weight, BMP    PAD  Poikilothermia of R foot, per patient this has been his chronic problem. US arterial RLE (12/22) with occlusion of DPA. Patent but with slow flow within PTA. No significant stenosis within the femoral-popliteal segment. US DVT (12/22) with no evidence of DVT in RLE. No pain, intact motor power, and sensation, less concerns for acute limb ischemia (also given negative lactate and CK) and more consistent with PAD with chronic arterial insufficiency.  - Vascular surgery consulted, no surgical intervention at this time    T2DM with micro and macrovascular complications  Noted to have PDR. HgbA1c 6.2% on 12/22.  - BS AC&HS, target 140-180 mg/dL  - mSSI  - Hypoglycemia protocol    Congestive hepatopathy: Improving, trend LFTs  Concern for GIB: Blood from OG tube on admission at [facility], Hgb stable, no recurrent bleeding. Continue Lansoprazole  Urinary retention: Zafar placed 12/28  Anxiety: Continue Celexa 20 mg daily     Diet: Cardiac diet with fluid restriction  DVT Prophylaxis: Enoxaparin (Lovenox) SQ  Zafar Catheter: in place, indication: Retention  Code Status: FULL code  Fluids: None  Lines: PIVs    Disposition Plan   Expected discharge: 4 - 7 days, recommended to prior living arrangement once fluid volume status optimized on oral medication and cardiac function improves.    Entered: Kelechi Oliveros MD 01/06/2021, 2:03 PM    Kelechi Oliveros MD  Internal Medicine, PGY-3  ______________________________________________________________________    Subjective:  - Continues to have intermittent dizziness and nausea w/o vomiting. States he's had vertigo for several years, usually worse with head movements so he gets out of bed slowly every morning. Meclizine has been ineffective in the past.   - He  reports improved strength in left leg  - Denies fever, chills, chest pain or shortness of breath     Review of Systems    The 4 point Review of Systems is negative other than noted in the HPI or here.    Past Medical History    I have reviewed this patient's medical history and updated it with pertinent information if needed.  Past medical history is as described above.    Past Surgical History   I have reviewed this patient's surgical history and updated it with pertinent information if needed.  Past Surgical History:   Procedure Laterality Date     CV RIGHT HEART CATH N/A 1/5/2021    Procedure: Right Heart Cath;  Surgeon: Quinten Oden MD;  Location:  HEART CARDIAC CATH LAB       Social History   I have reviewed this patient's social history and updated it with pertinent information if needed.  Social History     Tobacco Use     Smoking status: None   Substance Use Topics     Alcohol use: None     Drug use: None     Family History   I have reviewed this patient's family history and updated it with pertinent information if needed. Noncontributory.    Allergies   No Known Allergies    Physical Exam   Vital Signs: Temp: 98  F (36.7  C) Temp src: Oral BP: 101/66 Pulse: 82   Resp: 18 SpO2: 98 % O2 Device: Nasal cannula Oxygen Delivery: 3 LPM  Weight: 165 lbs 9.05 oz  Constitutional: awake, alert, cooperative  Eyes: Ptosis of left eye (at baseline per patient) with cataracts and non-react pupil  Neck: JVP ~10 cm  Respiratory: No increased work of breathing, +bi-basilar crackles, no wheezing  Cardiovascular: Regular rate and rhythm, normal S1 and S2, soft systolic murmur best heard at apex  GI: soft, non-tender, non-distended, normal bowel sounds   Genitounirinary: Zafar catheter in place   Skin: no bruising, bleeding or worrisome skin lesions   Ext: No lower extremity edema. Unchanged poikilothermia of R foot with no pain on active/passive movement, intact sensation and motor function.    Neurologic:  Awake, alert. 5/5 strength in bilateral upper extremities, 5/5 in RLE and 4/5 in LLE. Normal sensation in all extremities.     Data   Data reviewed today: I reviewed all medications, new labs and imaging results over the last 24 hours and discussed as pertinent in assessment and plan.    Recent Labs   Lab 01/06/21  0541 01/06/21  0153 01/05/21  1648 01/05/21  0513 01/04/21  1711 01/04/21  1711 01/04/21  0536 01/02/21 2130 01/02/21 2130   WBC  --  9.2  --   --   --  9.3 12.2*   < > 14.4*   HGB  --  10.9*  --   --   --  10.5* 10.8*   < > 11.7*   MCV  --  100  --   --   --  101* 99   < > 98   PLT  --  197  --  193  --  202 202   < > 214   INR  --   --   --   --   --   --   --   --  1.20*     --  142 139   < > 140 141   < > 138   POTASSIUM 3.9  --  3.8 4.1   < > 4.0 4.0   < > 4.2   CHLORIDE 102  --  102 102   < > 103 103   < > 101   CO2 32  --  31 31   < > 31 32   < > 31   BUN 31*  --  32* 34*   < > 34* 35*   < > 36*   CR 1.43*  --  1.56* 1.57*   < > 1.53* 1.49*   < > 1.47*   ANIONGAP 7  --  9 6   < > 6 7   < > 6   CAMILLE 8.8  --  8.8 8.6   < > 8.5 8.6   < > 8.5   *  --  129* 152*   < > 124* 136*   < > 149*   ALBUMIN 2.5*  --  2.4* 2.3*  --  2.2* 2.2*   < > 2.4*   PROTTOTAL 6.1*  --  6.0* 5.7*  --  5.8* 5.6*   < > 6.1*   BILITOTAL 1.6*  --  1.5* 0.8  --  0.7 0.8   < > 0.8   ALKPHOS 101  --  99 99  --  92 105   < > 116   ALT 88*  --  78* 81*  --  72* 81*   < > 106*   AST 50*  --  42 58*  --  34 39   < > 51*    < > = values in this interval not displayed.

## 2021-01-06 NOTE — PROVIDER NOTIFICATION
Upon initial assessment: pocket hematoma ~10 X 8 cm noted at L chest device incx site. Pt c/o tenderness at site. VS stable. Cards CC paged and instructed to hold heparin gtt and ana lilia/monitor incx site. CBC ordered. No other signs of bleeding except oozing from R internal jugular site (from [facility] on 1/5) which was redressed with quickclot drsg.

## 2021-01-06 NOTE — CONSULTS
Care Management Initial Consult    General Information  Assessment completed with: Patient    Type of CM/SW Visit: Initial Assessment    Primary Care Provider verified and updated as needed: No   Readmission within the last 30 days:     Reason for Consult: discharge planning  Advance Care Planning: Advance Care Planning Reviewed: (none on file)          Communication Assessment  Patient's communication style: spoken language (English or Bilingual)    Hearing Difficulty or Deaf: no   Wear Glasses or Blind: other (see comments)(L eye blind, wears readers )    Cognitive  Cognitive/Neuro/Behavioral: .WDL except  Level of Consciousness: alert  Arousal Level: opens eyes spontaneously  Orientation: oriented x 4  Mood/Behavior: withdrawn  Best Language: 0 - No aphasia  Speech: spontaneous, clear    Living Environment:   People in home: spouse     Current living Arrangements: ([facility])      Able to return to prior arrangements: (U recommendation)       Family/Social Support:  Care provided by: self, spouse/significant other  Provides care for: no one  Marital Status:   Support system: Wife          Description of Support System: Supportive, Involved    Support Assessment: Adequate family and caregiver support    Current Resources:   Skilled Home Care Services:  None noted  Community Resources: (none noted)  Equipment currently used at home: walker, rolling(readily available but does not currently use )  Supplies currently used at home: (none noted)    Employment/Financial:  Employment Status: (not discussed)        Financial Concerns: No concerns identified           Lifestyle & Psychosocial Needs:        Socioeconomic History     Marital status:      Spouse name: Not on file     Number of children: Not on file     Years of education: Not on file     Highest education level: Not on file          Functional Status:  Prior to admission patient needed assistance:   Dependent ADLs: Bathing  Dependent IADLs:  Independent  Assesssment of Functional Status: Needs placement in a SNF/TCF for rehabilitation    Values/Beliefs:  Spiritual, Cultural Beliefs, Scientologist Practices, Values that affect care: (not discussed, Hindu per facesheet)               Additional Information:  Pt is a 60-year-old male admitted to [facility] 1/2/21 from OSH for further management and possible advanced heart failure therapy evaluation. TCU is recommended at discharge.    Met with pt at bedside to discuss discharge plan. Pt in agreement with recommendation for TCU placement. Pt states he has been to [facility] [place] in the past and did not have a good experience. Discussed that pt will need to be placed in TCU in [place] d/t having [place]. Provided pt list of facilities but pt states he does not have his glasses. Read pt facilities closest to home and the medicare.gov star ratings. Pt requests referrals to [facility], [facility], and [facility]. Pt declines referral to [facility] as he knows people who have had bad experiences there.     Discussed with pt that SW will sent referrals to determine bed availability. Pt unsure about transportation plan at discharge, discussed that SW can set up ride if indicated.    Per primary team pt expected to remain hospitalized until the weekend or into next week. Referrals to be sent when d/c timeline more clear.    TCU preferences:  1) [facility] (ph [phone])  2) [facility] (ph [phone], fax [phone])  3) [facility] (ph [phone], fax [phone])    ANNIE Pittman, [facility] C.A. Dean HospitalSW  6C   [facility]  Pager [phone]  Phone [phone]

## 2021-01-06 NOTE — PROVIDER NOTIFICATION
Notification  Time of notification: 15:45  Provider notified: Cards 2 MD   Patient status: pt bleeding at R internal jugular site (from [facility] 1/5). pressure held for 10 minute and redressed with quickclot dressing    Vitals: Temp: 97.6  F (36.4  C) Temp src: Oral BP: 111/75 Pulse: 93   Resp: 18 SpO2: 99 % O2 Device: Nasal cannula Oxygen Delivery: 3 LPM    Order received: instructed hold pressure and notify Cards 2 if bleeding does not stop.   .

## 2021-01-06 NOTE — PLAN OF CARE
"/73 (BP Location: Right arm)   Pulse 87   Temp 98.1  F (36.7  C) (Oral)   Resp 18   Ht 1.93 m (6' 4\")   Wt 75.1 kg (165 lb 9.1 oz)   SpO2 98%   BMI 20.15 kg/m      Status: R heart cath and carotid U/S today  Vitals: Within normal parameters. Tele: sinus rhythm to sinus tach with frequent PVCs  Neuros: alert and oriented x4. Pt's mood withdrawn. Pt stated he is tired of having no idea what plans are happening each day. Staff went over schedule with him and acknowledged his feelings. LLE weakness unchanged. Numbness/tingling reported in bilat feet and fingertips. L eye blind at baseline.  IV: PIV infusing  Labs/Electrolytes: hep gtt running with hep Xa level in process. Potassium 40 meq given this evening.  Resp/trach: crackles noted in lung fields. SpO2>95% on 3L nasal cannula. Infrequent, fair cough noted.  Diet: no appetite earlier today. Pt able to eat most of dinner this PM. Pt stated he wasn't hungry earlier because of all the unexpected plans. Nausea intermittent. Nausea triggers minimized.  Bowel status: LBM 1/5. Incontinent. No stool this shift  : Zafar for urinary retention. Adequate output noted.  Skin: No new concerns noted. ICD incision appears WNL  Pain: Denied  Activity: assist of 2/lift with repositioning. Repositioning q 2 hours  Plan: Continue plan of care and update team with changes.    "

## 2021-01-06 NOTE — PLAN OF CARE
D: Patient transferred to [facility] for further management of possible advanced HF therapy eval (initially transferred from OSH to [facility] for cardiac intervention for late presentation of STEMI s/p PCI 12/22 and cardiogenic shock 2/2 ICM w/HFrEF and [facility] course c/b hypoxic respiratory failure 2/2 pulmonary edema and LLE weakness w/scattered acute/subacute ischemic lesions on MRI 12/27, and sustained VT s/p ICD 12/31). Hx. DM2 w/proliferative DR, HTN, osteomyelitis.   I/A: A&Ox4. VSS on 3L NC. SR 70s-80s w/freq PVCs. Denies pain. Heparin gtt on hold/stopped per Cards CC for pocket hematoma at ICD incx site (see provider notification note). BG stable overnight. Adequate uop via armenta catheter. 2/lift assist. Offered turn/reposition/weight shift in bed q2h. Appeared to rest comfortably overnight.   P: NPO since 0500 this AM per Cards CC for possible procedure today. Continue to monitor and notify Cards 2 with questions/concerns.

## 2021-01-06 NOTE — PLAN OF CARE
D: pt transferred from OSH on 1/2/2021 for further management and possible advanced heart failure therapy evaluation. Pt admitted to [facility] for cardiac intervention for late-presentation of anterior STEMI (12/22/2020) now s/p PCI to LAD/Diag. Initially in cardiogenic shock secondary to ICM with HFrEF, weaned off IABP and pressors by 12/23. Hospital course complicated by hypoxic respiratory failure secondary to pulmonary edema, LLE weakness with scattered acute/subacute ischemic lesion on MRI 12/27, sustained VT s/p ICD 12/31. PMH of PMhx of T2DM with proliferative DR, HTN, and osteomyelitis      I: Monitored vitals and assessed pt status.   Changed:   - pt bleeding at R internal jugular site (from [facility] 1/5). pressure held for 10 minute and redressed with quickclot          Vitals:  Temp: 97.6  F (36.4  C) Temp src: Oral BP: 111/75 Pulse: 93   Resp: 18 SpO2: 99 % O2 Device: Nasal cannula Oxygen Delivery: 3 LPM     A:   Neuro: A&O x 4.  Left  upper eyelid droop (baseline) and left eye blindness. Report intermittent dizziness with movement. Denies headache.  Report numbness and tingling (baseline). Weakness on LLE. calls appropriately   Cardiac: SR with frequent PAC  Respiratory: sating > 90% on 3L NC. BERRIOS  Diet/appetite:  2g Na diet. Poor appetite   Endocrine: BS check AC/HS  GI/: No BM this shift. Denies abdominal pain. intermittent nausea and vomiting with movement. Zafar cathter in place with good urine output. Zafar care completed    Activity: assist of 2 w/ lift. Weakness on LLE   Pain: denies pain   Skin: no new deficit noted. Hematoma on ICD incision site.   LDAs: PIV        P: Continue to monitor Pt status and report changes to treatment team.

## 2021-01-06 NOTE — PROGRESS NOTES
01/06/21 0800   Quick Adds   Type of Visit Initial Occupational Therapy Evaluation       Present no   Language Englislh   Living Environment   People in home spouse   Current Living Arrangements house  ([facility])   Home Accessibility stairs to enter home;stairs within home   Number of Stairs, Main Entrance 2   Stair Railings, Main Entrance none   Number of Stairs, Within Home, Primary other (see comments)  (14 upstairs to bedroom, 14 downstairs to basement )   Stair Railings, Within Home, Primary railing on right side (ascending)   Transportation Anticipated family or friend will provide   Living Environment Comments Pt lives in [facility] w/wife, uses trekking poles for ambulation, does not drive and receives assistance from wife or daughter    Self-Care   Usual Activity Tolerance fair   Current Activity Tolerance poor   Regular Exercise Yes   Activity/Exercise Type walking   Exercise Amount/Frequency daily   Equipment Currently Used at Home walker, rolling  (readily available but does not currently use )   Activity/Exercise/Self-Care Comment Pt reported walking 6.8 miles in Aug 2020, stopped 2/2 foot wound and rested for 5 weeks. Had been walking 1.5 miles since Dec using trek poles but was unable to move LLE at admission.    Disability/Function   Hearing Difficulty or Deaf no   Wear Glasses or Blind other (see comments)  (L eye blind, wears readers )   Concentrating, Remembering or Making Decisions Difficulty no   Difficulty Communicating no   Difficulty Eating/Swallowing no   Walking or Climbing Stairs Difficulty yes   Walking or Climbing Stairs ambulation difficulty, requires equipment   Dressing/Bathing Difficulty yes   Dressing/Bathing bathing difficulty, assistance 1 person   Toileting issues yes   Doing Errands Independently Difficulty (such as shopping) no   Fall history within last six months no   Change in Functional Status Since Onset of Current Illness/Injury yes   General  "Information   Onset of Illness/Injury or Date of Surgery 01/02/21   Referring Physician Yoly Frederick MD   Patient/Family Therapy Goal Statement (OT) \"To get out of here\"   Additional Occupational Profile Info/Pertinent History of Current Problem Per Chart: 60 year old male admitted on 1/2/2021. He has PMhx of T2DM with proliferative DR, HTN, osteomyelitis who was transferred to [facility] from [facility] for cardiac intervention for late-presentation of anterior STEMI (12/22/2020) now s/p PCI to LAD/Diag. Initially in cardiogenic shock secondary to ICM with HFrEF, weaned off IABP and pressors by 12/23. Hospital course complicated by hypoxic respiratory failure secondary to pulmonary edema, LLE weakness with scattered acute/subacute ischemic lesion on MRI 12/27, sustained VT s/p ICD 12/31. Transferred to [facility] (1/2/2021) for further management and possible advanced heart failure therapy evaluation.   Existing Precautions/Restrictions pacemaker   Limitations/Impairments visual   Left Upper Extremity (Weight-bearing Status) other (see comments)  (#10 lb restriction )   Right Upper Extremity (Weight-bearing Status) full weight-bearing (FWB)   Left Lower Extremity (Weight-bearing Status) full weight-bearing (FWB)   Right Lower Extremity (Weight-bearing Status) full weight-bearing (FWB)   Cognitive Status Examination   Orientation Status orientation to person, place and time   Cognitive Status Comments Intact   Visual Perception   Visual Impairment/Limitations other (see comments)  (L eye blind)   Sensory   Sensory Comments Pt reports chronic BLE numbness   Pain Assessment   Patient Currently in Pain No   Integumentary/Edema   Integumentary/Edema no deficits were identifed   Range of Motion Comprehensive   Comment, General Range of Motion Limited L sh flex 2/2 precautions, R sh flex 95deg    Strength Comprehensive (MMT)   Comment, General Manual Muscle Testing (MMT) Assessment R hand  strength 3/5  "   Instrumental Activities of Daily Living (IADL)   Previous Responsibilities meal prep;medication management;finances;laundry   Clinical Impression   Criteria for Skilled Therapeutic Interventions Met (OT) yes   OT Diagnosis decreased IND in ADLs/IADls   OT Problem List-Impairments impacting ADL problems related to;activity tolerance impaired;strength;range of motion (ROM);post-surgical precautions;vision;pain   Assessment of Occupational Performance 3-5 Performance Deficits   Identified Performance Deficits dressing, home mgmt   Planned Therapy Interventions (OT) ADL retraining;IADL retraining;strengthening;progressive activity/exercise;risk factor education   Clinical Decision Making Complexity (OT) low complexity   Therapy Frequency (OT) 5x/week   Predicted Duration of Therapy 1 week   Anticipated Equipment Needs Upon Discharge (OT)   (TBD)   Risks and Benefits of Treatment have been explained. Yes   Patient, Family & other staff in agreement with plan of care Yes   Comment-Clinical Impression Pt to benefit from skilled OT to address above deficits to maximize IND in ADLs/IADls    OT Discharge Planning    OT Discharge Recommendation (DC Rec) Transitional Care Facility   OT Rationale for DC Rec Pt below baseline, would benefit from further therapy to progress strength/endurance prior to discharging home. Pt agreeable to TCU placement   Total Evaluation Time (Minutes)   Total Evaluation Time (Minutes) 5

## 2021-01-07 ENCOUNTER — APPOINTMENT (OUTPATIENT)
Dept: OCCUPATIONAL THERAPY | Facility: CLINIC | Age: 61
End: 2021-01-07
Attending: INTERNAL MEDICINE
Payer: MEDICAID

## 2021-01-07 ENCOUNTER — APPOINTMENT (OUTPATIENT)
Dept: PHYSICAL THERAPY | Facility: CLINIC | Age: 61
End: 2021-01-07
Attending: INTERNAL MEDICINE
Payer: MEDICAID

## 2021-01-07 LAB
ALBUMIN SERPL-MCNC: 2.3 G/DL (ref 3.4–5)
ALBUMIN SERPL-MCNC: 2.3 G/DL (ref 3.4–5)
ALP SERPL-CCNC: 89 U/L (ref 40–150)
ALP SERPL-CCNC: 90 U/L (ref 40–150)
ALT SERPL W P-5'-P-CCNC: 67 U/L (ref 0–70)
ALT SERPL W P-5'-P-CCNC: 68 U/L (ref 0–70)
ANION GAP SERPL CALCULATED.3IONS-SCNC: 4 MMOL/L (ref 3–14)
ANION GAP SERPL CALCULATED.3IONS-SCNC: 4 MMOL/L (ref 3–14)
AST SERPL W P-5'-P-CCNC: 30 U/L (ref 0–45)
AST SERPL W P-5'-P-CCNC: 37 U/L (ref 0–45)
BILIRUB DIRECT SERPL-MCNC: 0.3 MG/DL (ref 0–0.2)
BILIRUB DIRECT SERPL-MCNC: 0.3 MG/DL (ref 0–0.2)
BILIRUB SERPL-MCNC: 0.8 MG/DL (ref 0.2–1.3)
BILIRUB SERPL-MCNC: 1 MG/DL (ref 0.2–1.3)
BUN SERPL-MCNC: 27 MG/DL (ref 7–30)
BUN SERPL-MCNC: 28 MG/DL (ref 7–30)
CALCIUM SERPL-MCNC: 8.4 MG/DL (ref 8.5–10.1)
CALCIUM SERPL-MCNC: 8.7 MG/DL (ref 8.5–10.1)
CHLORIDE SERPL-SCNC: 100 MMOL/L (ref 94–109)
CHLORIDE SERPL-SCNC: 101 MMOL/L (ref 94–109)
CO2 SERPL-SCNC: 34 MMOL/L (ref 20–32)
CO2 SERPL-SCNC: 35 MMOL/L (ref 20–32)
CREAT SERPL-MCNC: 1.36 MG/DL (ref 0.66–1.25)
CREAT SERPL-MCNC: 1.43 MG/DL (ref 0.66–1.25)
GFR SERPL CREATININE-BSD FRML MDRD: 52 ML/MIN/{1.73_M2}
GFR SERPL CREATININE-BSD FRML MDRD: 56 ML/MIN/{1.73_M2}
GLUCOSE BLDC GLUCOMTR-MCNC: 145 MG/DL (ref 70–99)
GLUCOSE BLDC GLUCOMTR-MCNC: 149 MG/DL (ref 70–99)
GLUCOSE BLDC GLUCOMTR-MCNC: 165 MG/DL (ref 70–99)
GLUCOSE BLDC GLUCOMTR-MCNC: 180 MG/DL (ref 70–99)
GLUCOSE SERPL-MCNC: 139 MG/DL (ref 70–99)
GLUCOSE SERPL-MCNC: 191 MG/DL (ref 70–99)
POTASSIUM SERPL-SCNC: 3.7 MMOL/L (ref 3.4–5.3)
POTASSIUM SERPL-SCNC: 3.8 MMOL/L (ref 3.4–5.3)
PROT SERPL-MCNC: 5.6 G/DL (ref 6.8–8.8)
PROT SERPL-MCNC: 5.6 G/DL (ref 6.8–8.8)
SODIUM SERPL-SCNC: 137 MMOL/L (ref 133–144)
SODIUM SERPL-SCNC: 140 MMOL/L (ref 133–144)

## 2021-01-07 PROCEDURE — 80076 HEPATIC FUNCTION PANEL: CPT | Performed by: INTERNAL MEDICINE

## 2021-01-07 PROCEDURE — 999N001017 HC STATISTIC GLUCOSE BY METER IP

## 2021-01-07 PROCEDURE — 214N000001 HC R&B CCU UMMC

## 2021-01-07 PROCEDURE — 80048 BASIC METABOLIC PNL TOTAL CA: CPT | Performed by: INTERNAL MEDICINE

## 2021-01-07 PROCEDURE — 97110 THERAPEUTIC EXERCISES: CPT | Mod: GP

## 2021-01-07 PROCEDURE — 250N000013 HC RX MED GY IP 250 OP 250 PS 637: Performed by: STUDENT IN AN ORGANIZED HEALTH CARE EDUCATION/TRAINING PROGRAM

## 2021-01-07 PROCEDURE — 99233 SBSQ HOSP IP/OBS HIGH 50: CPT | Mod: GC | Performed by: INTERNAL MEDICINE

## 2021-01-07 PROCEDURE — 97110 THERAPEUTIC EXERCISES: CPT | Mod: GO

## 2021-01-07 PROCEDURE — 36415 COLL VENOUS BLD VENIPUNCTURE: CPT | Performed by: INTERNAL MEDICINE

## 2021-01-07 PROCEDURE — 250N000013 HC RX MED GY IP 250 OP 250 PS 637: Performed by: INTERNAL MEDICINE

## 2021-01-07 PROCEDURE — 97530 THERAPEUTIC ACTIVITIES: CPT | Mod: GO

## 2021-01-07 RX ORDER — CAPTOPRIL 12.5 MG/1
12.5 TABLET ORAL 3 TIMES DAILY
Status: DISCONTINUED | OUTPATIENT
Start: 2021-01-07 | End: 2021-01-08

## 2021-01-07 RX ADMIN — LANSOPRAZOLE 30 MG: 30 TABLET, ORALLY DISINTEGRATING, DELAYED RELEASE ORAL at 08:30

## 2021-01-07 RX ADMIN — FUROSEMIDE 40 MG: 40 TABLET ORAL at 08:30

## 2021-01-07 RX ADMIN — TICAGRELOR 90 MG: 90 TABLET ORAL at 08:30

## 2021-01-07 RX ADMIN — FUROSEMIDE 40 MG: 40 TABLET ORAL at 15:24

## 2021-01-07 RX ADMIN — TICAGRELOR 90 MG: 90 TABLET ORAL at 20:01

## 2021-01-07 RX ADMIN — DIGOXIN 125 MCG: 125 TABLET ORAL at 08:30

## 2021-01-07 RX ADMIN — LANSOPRAZOLE 30 MG: 30 TABLET, ORALLY DISINTEGRATING, DELAYED RELEASE ORAL at 20:01

## 2021-01-07 RX ADMIN — ATORVASTATIN CALCIUM 40 MG: 40 TABLET, FILM COATED ORAL at 20:01

## 2021-01-07 RX ADMIN — MECLIZINE 12.5 MG: 12.5 TABLET ORAL at 08:30

## 2021-01-07 RX ADMIN — Medication 12.5 MG: at 20:01

## 2021-01-07 RX ADMIN — ASPIRIN 81 MG CHEWABLE TABLET 81 MG: 81 TABLET CHEWABLE at 08:30

## 2021-01-07 RX ADMIN — Medication 12.5 MG: at 08:30

## 2021-01-07 RX ADMIN — Medication 12.5 MG: at 14:12

## 2021-01-07 RX ADMIN — CITALOPRAM HYDROBROMIDE 20 MG: 20 TABLET ORAL at 08:30

## 2021-01-07 ASSESSMENT — ACTIVITIES OF DAILY LIVING (ADL)
ADLS_ACUITY_SCORE: 24
ADLS_ACUITY_SCORE: 22
ADLS_ACUITY_SCORE: 24
ADLS_ACUITY_SCORE: 24
ADLS_ACUITY_SCORE: 22
ADLS_ACUITY_SCORE: 24

## 2021-01-07 ASSESSMENT — MIFFLIN-ST. JEOR: SCORE: 1645.86

## 2021-01-07 NOTE — PLAN OF CARE
D: Admitted 1/2 from [facility] post-STEMI s/p PCI to LAD. C/b hypoxic respiratory failure 2/2 to pulmonary edema, LLE weakness with CVA, sustained VT s/p ICD. Hx of type 2 diabetes with proliferative DR, HTN, vertigo, and osteomyelitis.    I: Monitored vitals and assessed pt status.   Changed:  Saline locked PIV x2   PRN:    A: A0x4. VSS. SR with PAC/PVC and occasionally paced. Afebrile. On 3L O2 NC. Zafar catheter remains in place, refused catheter cares this evening. Nausea intermittent with movement. Repositioned occasionally throughout night, patient declines most of time. Up with 2, lift assist. Mepilex on coccyx changed, redness blanchable. Right internal jugular access CDI, no oozing. ICD site remains swollen. No BM. Poor appetite. Likes medications to be crushed in applesauce.     P: Continue to monitor Pt status and report changes to CARDS 2.

## 2021-01-07 NOTE — PROGRESS NOTES
Care Management Follow Up    Length of Stay (days): 5    Expected Discharge Date: 01/11/21     Concerns to be Addressed: discharge planning     Patient plan of care discussed at interdisciplinary rounds: No- spoke with team directly    Anticipated Discharge Disposition: Transitional Care     Anticipated Discharge Services: None  Anticipated Discharge DME: None    Patient/family educated on Medicare website which has current facility and service quality ratings: yes  Education Provided on the Discharge Plan:  yes  Patient/Family in Agreement with the Plan: yes    Referrals Placed by CM/SW: Post Acute Facilities  Private pay costs discussed: Not applicable    Additional Information:  Per primary team pt may be stable for discharge Monday, 1/11. Faxed referrals to pt's TCU preferences.    Faxed referrals to:  1) [facility] (ph [phone])  2) [facility] (ph [phone], fax [phone])  3) [facility] (ph [phone], fax [phone])    ANNIE Pittman, [facility] Medical CenterSW     [facility]  Pager [phone]  Phone [phone]

## 2021-01-07 NOTE — PROGRESS NOTES
"Admitted 1/2 from [facility]. Pt arrived at [facility] with STEMI now s/p PCI to LAD/Diag. At [facility] course complicated by resp. Failure secondary to pulmonary edema, CVA, and sustained VT s/p ICD. Arrived at [facility] 1/2 for further evaluation.     Neuro: A&Ox4.   Cardiac: Afebrile, VSS. SR with frequent PAC/PVC's.   Respiratory: NC 3L, can be weaned.   GI/: Urinary armenta cath. No BM this shift.   Diet/appetite: Tolerating 2g sodium diet. Complains of intermittent nausea/gagging feeling. Pt denies feeling dizzy, but states \"my doctor said it is positional vertigo.\" Acupressure bands in place. Pt keeping medications down when crushed with pudding/apple sauce.   Activity: Up with 2 assist; pt refused to get up this shift or be moved with lift device. Repos. Q2h.   Pain: Denies   Skin: Newly noticed wound L ear from nasal cannula pressure. Ear pads applied, team notified.   Lines: L&R PIV SL   Drains: Urinary Armenta.   Replacement: none     Fluids encouraged, pt eating 50% of meals. Pt states this is first time eating an actual meal in days. Tolerating greater amounts of meals.     L sided weakness. Pt also has left eye blindness from cataracts.     Pt has been resting comfortably, will continue to monitor and follow plan of care. Contact Cards 2 with concerns.     "

## 2021-01-08 ENCOUNTER — APPOINTMENT (OUTPATIENT)
Dept: PHYSICAL THERAPY | Facility: CLINIC | Age: 61
End: 2021-01-08
Attending: INTERNAL MEDICINE
Payer: MEDICAID

## 2021-01-08 ENCOUNTER — APPOINTMENT (OUTPATIENT)
Dept: GENERAL RADIOLOGY | Facility: CLINIC | Age: 61
End: 2021-01-08
Attending: STUDENT IN AN ORGANIZED HEALTH CARE EDUCATION/TRAINING PROGRAM
Payer: MEDICAID

## 2021-01-08 LAB
ALBUMIN SERPL-MCNC: 2.4 G/DL (ref 3.4–5)
ALBUMIN SERPL-MCNC: 2.4 G/DL (ref 3.4–5)
ALP SERPL-CCNC: 90 U/L (ref 40–150)
ALP SERPL-CCNC: 97 U/L (ref 40–150)
ALT SERPL W P-5'-P-CCNC: 65 U/L (ref 0–70)
ALT SERPL W P-5'-P-CCNC: 68 U/L (ref 0–70)
ANION GAP SERPL CALCULATED.3IONS-SCNC: 5 MMOL/L (ref 3–14)
ANION GAP SERPL CALCULATED.3IONS-SCNC: 5 MMOL/L (ref 3–14)
AST SERPL W P-5'-P-CCNC: 38 U/L (ref 0–45)
AST SERPL W P-5'-P-CCNC: 38 U/L (ref 0–45)
BILIRUB DIRECT SERPL-MCNC: 0.2 MG/DL (ref 0–0.2)
BILIRUB DIRECT SERPL-MCNC: 0.3 MG/DL (ref 0–0.2)
BILIRUB SERPL-MCNC: 0.8 MG/DL (ref 0.2–1.3)
BILIRUB SERPL-MCNC: 1.1 MG/DL (ref 0.2–1.3)
BUN SERPL-MCNC: 28 MG/DL (ref 7–30)
BUN SERPL-MCNC: 28 MG/DL (ref 7–30)
CALCIUM SERPL-MCNC: 8.4 MG/DL (ref 8.5–10.1)
CALCIUM SERPL-MCNC: 8.6 MG/DL (ref 8.5–10.1)
CHLORIDE SERPL-SCNC: 100 MMOL/L (ref 94–109)
CHLORIDE SERPL-SCNC: 99 MMOL/L (ref 94–109)
CO2 SERPL-SCNC: 32 MMOL/L (ref 20–32)
CO2 SERPL-SCNC: 33 MMOL/L (ref 20–32)
CREAT SERPL-MCNC: 1.37 MG/DL (ref 0.66–1.25)
CREAT SERPL-MCNC: 1.39 MG/DL (ref 0.66–1.25)
DIGOXIN SERPL-MCNC: 0.5 UG/L (ref 0.5–2)
GFR SERPL CREATININE-BSD FRML MDRD: 54 ML/MIN/{1.73_M2}
GFR SERPL CREATININE-BSD FRML MDRD: 55 ML/MIN/{1.73_M2}
GLUCOSE BLDC GLUCOMTR-MCNC: 135 MG/DL (ref 70–99)
GLUCOSE BLDC GLUCOMTR-MCNC: 141 MG/DL (ref 70–99)
GLUCOSE BLDC GLUCOMTR-MCNC: 172 MG/DL (ref 70–99)
GLUCOSE BLDC GLUCOMTR-MCNC: 250 MG/DL (ref 70–99)
GLUCOSE SERPL-MCNC: 137 MG/DL (ref 70–99)
GLUCOSE SERPL-MCNC: 185 MG/DL (ref 70–99)
PLATELET # BLD AUTO: 193 10E9/L (ref 150–450)
POTASSIUM SERPL-SCNC: 3.7 MMOL/L (ref 3.4–5.3)
POTASSIUM SERPL-SCNC: 3.7 MMOL/L (ref 3.4–5.3)
PROT SERPL-MCNC: 5.8 G/DL (ref 6.8–8.8)
PROT SERPL-MCNC: 6 G/DL (ref 6.8–8.8)
SODIUM SERPL-SCNC: 137 MMOL/L (ref 133–144)
SODIUM SERPL-SCNC: 138 MMOL/L (ref 133–144)

## 2021-01-08 PROCEDURE — 250N000013 HC RX MED GY IP 250 OP 250 PS 637: Performed by: STUDENT IN AN ORGANIZED HEALTH CARE EDUCATION/TRAINING PROGRAM

## 2021-01-08 PROCEDURE — 80048 BASIC METABOLIC PNL TOTAL CA: CPT | Performed by: INTERNAL MEDICINE

## 2021-01-08 PROCEDURE — 36415 COLL VENOUS BLD VENIPUNCTURE: CPT | Performed by: INTERNAL MEDICINE

## 2021-01-08 PROCEDURE — 250N000013 HC RX MED GY IP 250 OP 250 PS 637: Performed by: INTERNAL MEDICINE

## 2021-01-08 PROCEDURE — 214N000001 HC R&B CCU UMMC

## 2021-01-08 PROCEDURE — 999N001017 HC STATISTIC GLUCOSE BY METER IP

## 2021-01-08 PROCEDURE — 250N000012 HC RX MED GY IP 250 OP 636 PS 637: Performed by: STUDENT IN AN ORGANIZED HEALTH CARE EDUCATION/TRAINING PROGRAM

## 2021-01-08 PROCEDURE — 71045 X-RAY EXAM CHEST 1 VIEW: CPT

## 2021-01-08 PROCEDURE — 80076 HEPATIC FUNCTION PANEL: CPT | Performed by: INTERNAL MEDICINE

## 2021-01-08 PROCEDURE — 71045 X-RAY EXAM CHEST 1 VIEW: CPT | Mod: 26 | Performed by: RADIOLOGY

## 2021-01-08 PROCEDURE — 99232 SBSQ HOSP IP/OBS MODERATE 35: CPT | Mod: GC | Performed by: INTERNAL MEDICINE

## 2021-01-08 PROCEDURE — 85049 AUTOMATED PLATELET COUNT: CPT | Performed by: STUDENT IN AN ORGANIZED HEALTH CARE EDUCATION/TRAINING PROGRAM

## 2021-01-08 PROCEDURE — 80162 ASSAY OF DIGOXIN TOTAL: CPT | Performed by: INTERNAL MEDICINE

## 2021-01-08 PROCEDURE — 97110 THERAPEUTIC EXERCISES: CPT | Mod: GP

## 2021-01-08 RX ORDER — LISINOPRIL 5 MG/1
5 TABLET ORAL DAILY
Status: DISCONTINUED | OUTPATIENT
Start: 2021-01-08 | End: 2021-01-12 | Stop reason: HOSPADM

## 2021-01-08 RX ADMIN — CITALOPRAM HYDROBROMIDE 20 MG: 20 TABLET ORAL at 08:32

## 2021-01-08 RX ADMIN — LANSOPRAZOLE 30 MG: 30 TABLET, ORALLY DISINTEGRATING, DELAYED RELEASE ORAL at 20:01

## 2021-01-08 RX ADMIN — INSULIN ASPART 2 UNITS: 100 INJECTION, SOLUTION INTRAVENOUS; SUBCUTANEOUS at 21:58

## 2021-01-08 RX ADMIN — TICAGRELOR 90 MG: 90 TABLET ORAL at 08:32

## 2021-01-08 RX ADMIN — ATORVASTATIN CALCIUM 40 MG: 40 TABLET, FILM COATED ORAL at 20:01

## 2021-01-08 RX ADMIN — ASPIRIN 81 MG CHEWABLE TABLET 81 MG: 81 TABLET CHEWABLE at 08:32

## 2021-01-08 RX ADMIN — FUROSEMIDE 40 MG: 40 TABLET ORAL at 08:32

## 2021-01-08 RX ADMIN — LANSOPRAZOLE 30 MG: 30 TABLET, ORALLY DISINTEGRATING, DELAYED RELEASE ORAL at 08:32

## 2021-01-08 RX ADMIN — LISINOPRIL 5 MG: 5 TABLET ORAL at 17:03

## 2021-01-08 RX ADMIN — TICAGRELOR 90 MG: 90 TABLET ORAL at 20:01

## 2021-01-08 RX ADMIN — FUROSEMIDE 40 MG: 40 TABLET ORAL at 16:41

## 2021-01-08 RX ADMIN — DIGOXIN 125 MCG: 125 TABLET ORAL at 08:33

## 2021-01-08 RX ADMIN — Medication 12.5 MG: at 08:32

## 2021-01-08 ASSESSMENT — ACTIVITIES OF DAILY LIVING (ADL)
ADLS_ACUITY_SCORE: 24
ADLS_ACUITY_SCORE: 22
ADLS_ACUITY_SCORE: 22
ADLS_ACUITY_SCORE: 24
ADLS_ACUITY_SCORE: 22
ADLS_ACUITY_SCORE: 24

## 2021-01-08 ASSESSMENT — MIFFLIN-ST. JEOR: SCORE: 1641.33

## 2021-01-08 NOTE — PROGRESS NOTES
[facility]     Cardiology History and Physical - Cards 2    Date of Admission: 1/2/2021    Assessment & Plan: HVSL    Willy Harrell is a 60 year old male admitted on 1/2/2021. He has PMhx of T2DM with proliferative DR, HTN, osteomyelitis who was transferred to [facility] from [facility] for cardiac intervention for late-presentation of anterior STEMI (12/22/2020) now s/p PCI to LAD/Diag. Initially in cardiogenic shock secondary to ICM with HFrEF, weaned off IABP and pressors by 12/23. Hospital course complicated by hypoxic respiratory failure secondary to pulmonary edema, LLE weakness with scattered acute/subacute ischemic lesion on MRI 12/27, sustained VT s/p ICD 12/31. Transferred to [facility] (1/2/2021) for further management and possible advanced heart failure therapy evaluation.    1/7   - continue lasix 40 PO BID  - increase captopril to 12.5 mg TID; plan to switch to lisinopril once the dose is optimized   - need to find primary cardiologist prior to discharge to the TCU   - continue dual antiplatelet  - continue to hold heparin    CAD with anterior STEMI, s/p DESx2 to prox and mid LAD + POBA of large D2 12/22/2020  Cardiogenic shock (resolved) 2/2 acute decompensated HFrEF from ICM  Sustained VT s/p ICD  Moderate MR  EF 25-40% on 01/03. Overall, he appears to have recovered some LVEF function based on echo results. 01/05 RHC: RA 9, PA 49/20 (30), PCWP 20, RV 50 50/6 (9). Working on optimizing medical therapy. If this does not result in significant improvement in cardiac function, will consider inotrope and advance heart failure therapies.   - Increase captopril to 12.5 mg TID today  - Continue PO lasix 40 mg BID, I/O goal net even   - Holding BB and spironolactone for now   - BMPs, strict I/Os, fluid restriction, low salt diet   - Continue ASA, Ticagrelor and Atorvastatin   - ICD interrogated 01/04; no arrhythmia, normal ICD function   - Continue  low-intensity heparin as patient is at increased risk of thrombus formation after large anterior wall MI    Acute hypoxic respiratory failure secondary to cardiogenic pulmonary edema   Has recurrent pulmonary edema with increased O2 requirement, extubated 12/24 now stable on 3-4L NC. CXR consistent with pulmonary edema with bilateral pleural effusion. Differential includes evolving pneumonia but this is less likely. Completed a course of ceftriaxone on 12/28.  - Diuresis as above  - O2 support, keep SpO2 > 92%  - Closely monitor I/O, weight, BMP    Acute/subacute infarcts  LLE weakness  Vertigo   Developed LLE weakness after hospitalization, unclear etiology. 12/30 MRI brain (w/o contrast) at [facility] showed small foci of FLAIR hyperintensity and restricted diffusion within the anterior right frontal lobe, lateral and inferior left frontal lobe, left periatrial white matter, posterior right parietal lobe, left occipital lobe, left temporal periventricular white matter and left cerebellum, compatible with small areas of acute or subacute infarct. MRI brain w/ contrast the following day (12/31) showed no abnormal enhancement, although radiologist noted that the infarcts were better evaluated on the non-contrast MRI. Evaluated by neurology upon transfer here, deemed to be optimized from medical standpoint. He's already on DAPT and atorvastatin after recent coronary stents. 01/04 CHRISTOPHE negative for thrombus. Bilateral carotid ultrasound showed no significant stenoses.   - Neurology recommending CTA head and neck to complete stroke workup. Holding off pending improvement in renal function  - PT/OT following  - Will curbside neurology re: vertigo  - Follow-up with local neurologist ~02/08-02/15 for further evaluation per recommendation of inpatient neurology team    ZAIN secondary to cardiorenal physiology  Baseline Cr ~1, was 3.12 on admission suspected to be secondary to cardiogenic shock/ATN, and cardiorenal  physiology. Cr stable ~1.5.  - Heart failure management as above  - Closely monitor I/O, weight, BMP    PAD  Poikilothermia of R foot, per patient this has been his chronic problem. US arterial RLE (12/22) with occlusion of DPA. Patent but with slow flow within PTA. No significant stenosis within the femoral-popliteal segment. US DVT (12/22) with no evidence of DVT in RLE. No pain, intact motor power, and sensation, less concerns for acute limb ischemia (also given negative lactate and CK) and more consistent with PAD with chronic arterial insufficiency.  - Vascular surgery consulted, no surgical intervention at this time    T2DM with micro and macrovascular complications  Noted to have PDR. HgbA1c 6.2% on 12/22.  - BS AC&HS, target 140-180 mg/dL  - mSSI  - Hypoglycemia protocol    Congestive hepatopathy: Improving, trend LFTs  Concern for GIB: Blood from OG tube on admission at [facility], Hgb stable, no recurrent bleeding. Continue Lansoprazole  Urinary retention: Zafar placed 12/28  Anxiety: Continue Celexa 20 mg daily     Diet: Cardiac diet with fluid restriction  DVT Prophylaxis: Enoxaparin (Lovenox) SQ  Zafar Catheter: in place, indication: Strict 1-2 Hour I&O  Code Status: FULL code  Fluids: None  Lines: PIVs    Disposition Plan   Expected discharge: 4 - 7 days, recommended to prior living arrangement once fluid volume status optimized on oral medication and cardiac function improves.    Entered: Kelechi Oliveros MD 01/07/2021, 7:54 PM    Omid Olson MD  Internal Medicine, PGY-1  ______________________________________________________________________    Subjective:  - Reports having dizziness when he moves. Denies any nauseous feeling. Still has multiple dry heaves this AM.  - No worsening pain at the ICD site   - Denies fever, chills, chest pain, palpitation, shortness of breath, or lightheadedness.     Review of Systems    The 4 point Review of Systems is negative other than noted in the HPI or  here.    Past Medical History    I have reviewed this patient's medical history and updated it with pertinent information if needed.  Past medical history is as described above.    Past Surgical History   I have reviewed this patient's surgical history and updated it with pertinent information if needed.  Past Surgical History:   Procedure Laterality Date     CV RIGHT HEART CATH N/A 1/5/2021    Procedure: Right Heart Cath;  Surgeon: Quinten Oden MD;  Location:  HEART CARDIAC CATH LAB     TRANSESOPHAGEAL ECHOCARDIOGRAM INTRAOPERATIVE N/A 1/4/2021    Procedure: ECHOCARDIOGRAM, TRANSESOPHAGEAL, INTRAOPERATIVE;  Surgeon: GENERIC ANESTHESIA PROVIDER;  Location:  OR       Social History   I have reviewed this patient's social history and updated it with pertinent information if needed.  Social History     Tobacco Use     Smoking status: None   Substance Use Topics     Alcohol use: None     Drug use: None     Family History   I have reviewed this patient's family history and updated it with pertinent information if needed. Noncontributory.    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 Unchanged poikilothermia of R foot with no pain on active/passive movement, intact sensation and motor function.    Neurologic: Awake, alert. Motor  exam unchanged compared to yesterday 1/6    Data   Data reviewed today: I reviewed all medications, new labs and imaging results over the last 24 hours and discussed as pertinent in assessment and plan.    Recent Labs   Lab 01/07/21  1615 01/07/21  0508 01/06/21  1759 01/06/21  0153 01/06/21  0153 01/05/21  0513 01/05/21  0513 01/04/21  1711 01/04/21  1711 01/04/21  0536 01/02/21  2130 01/02/21 2130   WBC  --   --   --   --  9.2  --   --   --  9.3 12.2*   < > 14.4*   HGB  --   --   --   --  10.9*  --   --   --  10.5* 10.8*   < > 11.7*   MCV  --   --   --   --  100  --   --   --  101* 99   < > 98   PLT  --   --   --   --  197  --  193  --  202 202   < > 214   INR  --   --   --   --   --   --   --   --   --   --   --  1.20*    140 138   < >  --    < > 139   < > 140 141   < > 138   POTASSIUM 3.7 3.8 4.0   < >  --    < > 4.1   < > 4.0 4.0   < > 4.2   CHLORIDE 100 101 101   < >  --    < > 102   < > 103 103   < > 101   CO2 34* 35* 33*   < >  --    < > 31   < > 31 32   < > 31   BUN 28 27 30   < >  --    < > 34*   < > 34* 35*   < > 36*   CR 1.36* 1.43* 1.38*   < >  --    < > 1.57*   < > 1.53* 1.49*   < > 1.47*   ANIONGAP 4 4 5   < >  --    < > 6   < > 6 7   < > 6   CAMILLE 8.4* 8.7 8.8   < >  --    < > 8.6   < > 8.5 8.6   < > 8.5   * 139* 162*   < >  --    < > 152*   < > 124* 136*   < > 149*   ALBUMIN 2.3* 2.3* 2.4*   < >  --    < > 2.3*  --  2.2* 2.2*   < > 2.4*   PROTTOTAL 5.6* 5.6* 6.1*   < >  --    < > 5.7*  --  5.8* 5.6*   < > 6.1*   BILITOTAL 0.8 1.0 0.9   < >  --    < > 0.8  --  0.7 0.8   < > 0.8   ALKPHOS 90 89 99   < >  --    < > 99  --  92 105   < > 116   ALT 67 68 81*   < >  --    < > 81*  --  72* 81*   < > 106*   AST 37 30 44   < >  --    < > 58*  --  34 39   < > 51*    < > = values in this interval not displayed.

## 2021-01-08 NOTE — PLAN OF CARE
D:  Admitted 1/2 from [facility] post-STEMI s/p PCI to LAD. C/b hypoxic respiratory failure 2/2 to pulmonary edema, LLE weakness with CVA, sustained VT s/p ICD. Hx of type 2 diabetes with proliferative DR, HTN, vertigo, and osteomyelitis.    I: Monitored vitals and assessed pt status.   Changed:  Saline locked PIV x2   PRN:    A: A0x4. VSS. 2 L O2 NC. SR with PAC and paced occasionally. Afebrile. Zafar catheter in place, see flow sheet for output. 2 person lift assist. Repositioned throughout night, patient declines most of time. Incontinent of stool x1. Mepilex on coccyx, redness blanchable. Left sided ear wound from NC, ear padding in place. Poor appetite. N/V with movement. LLE weakness. LLE cool to touch (baseline). Right internal jugular access CDI. ICD placement site swollen. No c/o pain. 2g Na diet and 1.8 L fluid restriction. Blood glucose stable, no correction given.     P: Continue to monitor Pt status and report changes to CARDS 2.

## 2021-01-08 NOTE — PLAN OF CARE
"  BP 96/60 (BP Location: Right arm)   Pulse 79   Temp 97.6  F (36.4  C) (Oral)   Resp 16   Ht 1.93 m (6' 4\")   Wt 73.5 kg (162 lb)   SpO2 96%   BMI 19.72 kg/m      Time: 3815-9614.  Reason for admission: Transferred from [facility] for cardiac intervention for late presentation of anterior STEMI (12/22/2020) now s/p PCI c/b respiratory failure 2/2 pulmonary edema and LLE weakness, SVT s/p ICD 12/31. PMhx of T2DM with proliferative DR, HTN, osteomyelitis.    VS: VSS on 1-2L NC with O2 sats in high 90s. Afebrile.   Activity: Up with assist x2 with lift. Refusing repositioning most of shift, education provided, pt c/o nausea/vomiting with any movement, repositioning encouraged, pulsate mattress ordered. Calls appropriately.   Neuros: A&Ox4. Intermittent lethargy. Frustrated with hospital stay. Denies pain. BUE strengths 5/5, LLE 2/5, RLE 3/5. BLEs cool to the touch, +1 pulses. Left eye blind, cataract, detached retina, fixed pupil.   Cardiac: SR with PACs. Soft pressures, 90-100s/60-70s. HR stable in 70-80s. Denies chest pain. Permanent pacemaker, site swollen, denies pain at site.   Respiratory: LS clear but diminished. Denies SOB or BERRIOS. Stable on 1-2L NC. Infrequent congested cough.   GI/: Zafar with whitney urine output. No BM on this shift, LBM 1/7. +BS, +gas. C/o nausea with movement related to vertigo, x1 emesis, declined antiemetic.   Diet: 2g Na diet, tolerating well. BG stable at 135 & 141, receiving sliding scale insulin. 1.8L FR.   Skin: Right neck site from old IJ CDI, dressing changed. Mepliex on coccyx. Pt refusing to turn for full skin assessment.   Lines: Right and left PIV SL'd.   Labs: WDL.    New changes this shift/Plan: VSS on 1-2L NC, afebrile. SR with PACs. Refusing repositioning, education provided, pulsate mattress ordered. Neuros unchanged. Tolerating diet, c/o nausea/vomiting with movement d/t vertigo. Right neck site dressing changed, CDI. SHEYLA FREEDMAN'd.   Will continue to monitor & follow " POC.

## 2021-01-08 NOTE — PROGRESS NOTES
Care Management Follow Up    Length of Stay (days): 6    Expected Discharge Date: 01/11/21     Concerns to be Addressed: discharge planning     Patient plan of care discussed at interdisciplinary rounds: Yes    Anticipated Discharge Disposition: Transitional Care     Anticipated Discharge Services: None  Anticipated Discharge DME: None    Patient/family educated on Medicare website which has current facility and service quality ratings: yes  Education Provided on the Discharge Plan:  yes  Patient/Family in Agreement with the Plan: yes    Referrals Placed by CM/SW:   1) [facility] ( [phone]) - received phone call from admissions, unable to accept due to facility does not accept pt's insurance.   2) [facility] ([place] [phone], fax [phone]) - left VM with admissions regarding referral.  3) [facility] ([place] [phone], fax [phone]) - unable to accept pt's MA    Private pay costs discussed: Not applicable    Additional Information:  BROOKE met with pt at bedside to provide an update. Pt appreciative. SW to continue to follow for discharge planning.    Addend 0498: BROOKE received phone call from Camille at [facility] stating they are unable to accept pt's MA.     Addend 1320: BROOKE spoke with Yari at [facility] (direct number: [phone].) Yari reports they will be able to accept pt on Monday if medically stable, requested SW check in Monday AM. Yari stated that she she will need to get prior auth from pt's MA plan - which she will start today. Yari requested BROOKE fax recent COVID test to her - BROOKE faxed.    Addend 1340: BROOKE received phone call from Yari stating the facility is out of network with pt's insurance.   BROOKE reviewed [facility]'s in-network provider website. Per website, the two facilities that are in-network with pt's insurance plan are   - [facility] [phone] - BROOKE attempted to call admissions to confirm, however per   admissions staff was busy at the time. Will call back.   - [facility] [phone] - BROOKE spoke with Camille in admissions who confirmed that they are in network with insurance plan.  BROOKE met with pt at bedside to update him. Pt expressed appropriate frustration. Pt was agreeable to referrals being sent to the above facilities.  BROOKE also reviewed medicare.gov list ratings for those facilities.   BROOKE faxed referral to [facility] [phone].   BROOKE updated pt's spouse Diandra [phone]. Diandra stated pt will need transportation set up.     BROOKE spoke with Milly in admissions at [facility], per Milly she would have to call to verify if they are in-network. BROOKE faxed referral to [phone].    ANNIE Dias, [facility]  Adult Acute Care BROOKE  Ph:[phone]  Pager [phone]

## 2021-01-09 LAB
ALBUMIN SERPL-MCNC: 2.3 G/DL (ref 3.4–5)
ALBUMIN SERPL-MCNC: 2.4 G/DL (ref 3.4–5)
ALP SERPL-CCNC: 90 U/L (ref 40–150)
ALP SERPL-CCNC: 90 U/L (ref 40–150)
ALT SERPL W P-5'-P-CCNC: 57 U/L (ref 0–70)
ALT SERPL W P-5'-P-CCNC: 64 U/L (ref 0–70)
ANION GAP SERPL CALCULATED.3IONS-SCNC: 5 MMOL/L (ref 3–14)
ANION GAP SERPL CALCULATED.3IONS-SCNC: 7 MMOL/L (ref 3–14)
AST SERPL W P-5'-P-CCNC: 48 U/L (ref 0–45)
AST SERPL W P-5'-P-CCNC: 54 U/L (ref 0–45)
BILIRUB DIRECT SERPL-MCNC: 0.3 MG/DL (ref 0–0.2)
BILIRUB DIRECT SERPL-MCNC: 0.3 MG/DL (ref 0–0.2)
BILIRUB SERPL-MCNC: 1.3 MG/DL (ref 0.2–1.3)
BILIRUB SERPL-MCNC: 1.4 MG/DL (ref 0.2–1.3)
BUN SERPL-MCNC: 27 MG/DL (ref 7–30)
BUN SERPL-MCNC: 28 MG/DL (ref 7–30)
CALCIUM SERPL-MCNC: 8.3 MG/DL (ref 8.5–10.1)
CALCIUM SERPL-MCNC: 8.3 MG/DL (ref 8.5–10.1)
CHLORIDE SERPL-SCNC: 101 MMOL/L (ref 94–109)
CHLORIDE SERPL-SCNC: 99 MMOL/L (ref 94–109)
CO2 SERPL-SCNC: 32 MMOL/L (ref 20–32)
CO2 SERPL-SCNC: 33 MMOL/L (ref 20–32)
CREAT SERPL-MCNC: 1.4 MG/DL (ref 0.66–1.25)
CREAT SERPL-MCNC: 1.44 MG/DL (ref 0.66–1.25)
GFR SERPL CREATININE-BSD FRML MDRD: 52 ML/MIN/{1.73_M2}
GFR SERPL CREATININE-BSD FRML MDRD: 54 ML/MIN/{1.73_M2}
GLUCOSE BLDC GLUCOMTR-MCNC: 135 MG/DL (ref 70–99)
GLUCOSE BLDC GLUCOMTR-MCNC: 154 MG/DL (ref 70–99)
GLUCOSE BLDC GLUCOMTR-MCNC: 165 MG/DL (ref 70–99)
GLUCOSE SERPL-MCNC: 139 MG/DL (ref 70–99)
GLUCOSE SERPL-MCNC: 170 MG/DL (ref 70–99)
INR PPP: 1.27 (ref 0.86–1.14)
MAGNESIUM SERPL-MCNC: 2 MG/DL (ref 1.6–2.3)
MDC_IDC_LEAD_IMPLANT_DT: NORMAL
MDC_IDC_LEAD_IMPLANT_DT: NORMAL
MDC_IDC_LEAD_LOCATION: NORMAL
MDC_IDC_LEAD_LOCATION: NORMAL
MDC_IDC_LEAD_LOCATION_DETAIL_1: NORMAL
MDC_IDC_LEAD_LOCATION_DETAIL_1: NORMAL
MDC_IDC_LEAD_MFG: NORMAL
MDC_IDC_LEAD_MFG: NORMAL
MDC_IDC_LEAD_MODEL: NORMAL
MDC_IDC_LEAD_MODEL: NORMAL
MDC_IDC_LEAD_POLARITY_TYPE: NORMAL
MDC_IDC_LEAD_POLARITY_TYPE: NORMAL
MDC_IDC_LEAD_SERIAL: NORMAL
MDC_IDC_LEAD_SERIAL: NORMAL
MDC_IDC_MSMT_BATTERY_REMAINING_LONGEVITY: NORMAL
MDC_IDC_MSMT_BATTERY_STATUS: NORMAL
MDC_IDC_MSMT_CAP_CHARGE_DTM: NORMAL
MDC_IDC_MSMT_CAP_CHARGE_ENERGY: 0 J
MDC_IDC_MSMT_CAP_CHARGE_TIME: 0 S
MDC_IDC_MSMT_CAP_CHARGE_TIME: 8.74
MDC_IDC_MSMT_CAP_CHARGE_TYPE: NORMAL
MDC_IDC_MSMT_CAP_CHARGE_TYPE: NORMAL
MDC_IDC_MSMT_LEADCHNL_RA_IMPEDANCE_VALUE: 467 OHM
MDC_IDC_MSMT_LEADCHNL_RA_PACING_THRESHOLD_AMPLITUDE: 0.7 V
MDC_IDC_MSMT_LEADCHNL_RA_PACING_THRESHOLD_PULSEWIDTH: 0.4 MS
MDC_IDC_MSMT_LEADCHNL_RA_SENSING_INTR_AMPL: 5.8 MV
MDC_IDC_MSMT_LEADCHNL_RV_IMPEDANCE_VALUE: 391 OHM
MDC_IDC_MSMT_LEADCHNL_RV_PACING_THRESHOLD_AMPLITUDE: 0.6 V
MDC_IDC_MSMT_LEADCHNL_RV_PACING_THRESHOLD_PULSEWIDTH: 0.4 MS
MDC_IDC_MSMT_LEADCHNL_RV_SENSING_INTR_AMPL: 10.2 MV
MDC_IDC_PG_IMPLANT_DTM: NORMAL
MDC_IDC_PG_MFG: NORMAL
MDC_IDC_PG_MODEL: NORMAL
MDC_IDC_PG_SERIAL: NORMAL
MDC_IDC_PG_TYPE: NORMAL
MDC_IDC_SESS_CLINIC_NAME: NORMAL
MDC_IDC_SESS_DTM: NORMAL
MDC_IDC_SESS_TYPE: NORMAL
MDC_IDC_SET_BRADY_AT_MODE_SWITCH_MODE: NORMAL
MDC_IDC_SET_BRADY_AT_MODE_SWITCH_RATE: 170 {BEATS}/MIN
MDC_IDC_SET_BRADY_HYSTRATE: NORMAL
MDC_IDC_SET_BRADY_LOWRATE: 50 {BEATS}/MIN
MDC_IDC_SET_BRADY_MAX_SENSOR_RATE: 100 {BEATS}/MIN
MDC_IDC_SET_BRADY_MAX_TRACKING_RATE: 100 {BEATS}/MIN
MDC_IDC_SET_BRADY_MODE: NORMAL
MDC_IDC_SET_BRADY_PAV_DELAY_HIGH: 200 MS
MDC_IDC_SET_BRADY_PAV_DELAY_LOW: 250 MS
MDC_IDC_SET_BRADY_SAV_DELAY_HIGH: 200 MS
MDC_IDC_SET_BRADY_SAV_DELAY_LOW: 250 MS
MDC_IDC_SET_LEADCHNL_RA_PACING_AMPLITUDE: 3.5 V
MDC_IDC_SET_LEADCHNL_RA_PACING_CAPTURE_MODE: NORMAL
MDC_IDC_SET_LEADCHNL_RA_PACING_POLARITY: NORMAL
MDC_IDC_SET_LEADCHNL_RA_PACING_PULSEWIDTH: 0.4 MS
MDC_IDC_SET_LEADCHNL_RA_SENSING_ADAPTATION_MODE: NORMAL
MDC_IDC_SET_LEADCHNL_RA_SENSING_POLARITY: NORMAL
MDC_IDC_SET_LEADCHNL_RA_SENSING_SENSITIVITY: 0.25 MV
MDC_IDC_SET_LEADCHNL_RV_PACING_AMPLITUDE: 3.5 V
MDC_IDC_SET_LEADCHNL_RV_PACING_CAPTURE_MODE: NORMAL
MDC_IDC_SET_LEADCHNL_RV_PACING_POLARITY: NORMAL
MDC_IDC_SET_LEADCHNL_RV_PACING_PULSEWIDTH: 0.4 MS
MDC_IDC_SET_LEADCHNL_RV_SENSING_ADAPTATION_MODE: NORMAL
MDC_IDC_SET_LEADCHNL_RV_SENSING_POLARITY: NORMAL
MDC_IDC_SET_LEADCHNL_RV_SENSING_SENSITIVITY: 0.6 MV
MDC_IDC_SET_ZONE_DETECTION_INTERVAL: 286 MS
MDC_IDC_SET_ZONE_DETECTION_INTERVAL: 545 MS
MDC_IDC_SET_ZONE_TYPE: NORMAL
MDC_IDC_SET_ZONE_VENDOR_TYPE: NORMAL
MDC_IDC_STAT_AT_BURDEN_PERCENT: 0 %
MDC_IDC_STAT_BRADY_RA_PERCENT_PACED: 1 %
MDC_IDC_STAT_BRADY_RV_PERCENT_PACED: 1 %
MDC_IDC_STAT_EPISODE_RECENT_COUNT: 0
MDC_IDC_STAT_EPISODE_RECENT_COUNT_DTM_END: NORMAL
MDC_IDC_STAT_EPISODE_RECENT_COUNT_DTM_START: NORMAL
MDC_IDC_STAT_EPISODE_TOTAL_COUNT: 0
MDC_IDC_STAT_EPISODE_TOTAL_COUNT_DTM_END: NORMAL
MDC_IDC_STAT_EPISODE_TYPE: NORMAL
MDC_IDC_STAT_EPISODE_VENDOR_TYPE: NORMAL
MDC_IDC_STAT_TACHYTHERAPY_ATP_DELIVERED_RECENT: 0
MDC_IDC_STAT_TACHYTHERAPY_ATP_DELIVERED_TOTAL: 0
MDC_IDC_STAT_TACHYTHERAPY_RECENT_DTM_END: NORMAL
MDC_IDC_STAT_TACHYTHERAPY_RECENT_DTM_START: NORMAL
MDC_IDC_STAT_TACHYTHERAPY_SHOCKS_ABORTED_RECENT: 0
MDC_IDC_STAT_TACHYTHERAPY_SHOCKS_ABORTED_TOTAL: 0
MDC_IDC_STAT_TACHYTHERAPY_SHOCKS_DELIVERED_RECENT: 0
MDC_IDC_STAT_TACHYTHERAPY_SHOCKS_DELIVERED_TOTAL: 0
MDC_IDC_STAT_TACHYTHERAPY_TOTAL_DTM_END: NORMAL
POTASSIUM SERPL-SCNC: 3.6 MMOL/L (ref 3.4–5.3)
POTASSIUM SERPL-SCNC: 3.7 MMOL/L (ref 3.4–5.3)
PROT SERPL-MCNC: 5.8 G/DL (ref 6.8–8.8)
PROT SERPL-MCNC: 5.8 G/DL (ref 6.8–8.8)
SODIUM SERPL-SCNC: 137 MMOL/L (ref 133–144)
SODIUM SERPL-SCNC: 139 MMOL/L (ref 133–144)

## 2021-01-09 PROCEDURE — 214N000001 HC R&B CCU UMMC

## 2021-01-09 PROCEDURE — 250N000013 HC RX MED GY IP 250 OP 250 PS 637: Performed by: STUDENT IN AN ORGANIZED HEALTH CARE EDUCATION/TRAINING PROGRAM

## 2021-01-09 PROCEDURE — 250N000013 HC RX MED GY IP 250 OP 250 PS 637: Performed by: INTERNAL MEDICINE

## 2021-01-09 PROCEDURE — 36415 COLL VENOUS BLD VENIPUNCTURE: CPT | Performed by: INTERNAL MEDICINE

## 2021-01-09 PROCEDURE — 80048 BASIC METABOLIC PNL TOTAL CA: CPT | Performed by: INTERNAL MEDICINE

## 2021-01-09 PROCEDURE — 83735 ASSAY OF MAGNESIUM: CPT | Performed by: INTERNAL MEDICINE

## 2021-01-09 PROCEDURE — 99232 SBSQ HOSP IP/OBS MODERATE 35: CPT | Mod: GC | Performed by: INTERNAL MEDICINE

## 2021-01-09 PROCEDURE — 999N001017 HC STATISTIC GLUCOSE BY METER IP

## 2021-01-09 PROCEDURE — 80076 HEPATIC FUNCTION PANEL: CPT | Performed by: INTERNAL MEDICINE

## 2021-01-09 PROCEDURE — 85610 PROTHROMBIN TIME: CPT | Performed by: INTERNAL MEDICINE

## 2021-01-09 RX ORDER — METOCLOPRAMIDE HYDROCHLORIDE 5 MG/ML
10 INJECTION INTRAMUSCULAR; INTRAVENOUS EVERY 6 HOURS PRN
Status: DISCONTINUED | OUTPATIENT
Start: 2021-01-09 | End: 2021-01-12 | Stop reason: HOSPADM

## 2021-01-09 RX ORDER — WARFARIN SODIUM 2.5 MG/1
2.5 TABLET ORAL
Status: COMPLETED | OUTPATIENT
Start: 2021-01-09 | End: 2021-01-09

## 2021-01-09 RX ADMIN — FUROSEMIDE 40 MG: 40 TABLET ORAL at 08:11

## 2021-01-09 RX ADMIN — DIGOXIN 125 MCG: 125 TABLET ORAL at 08:11

## 2021-01-09 RX ADMIN — LISINOPRIL 5 MG: 5 TABLET ORAL at 17:32

## 2021-01-09 RX ADMIN — TICAGRELOR 90 MG: 90 TABLET ORAL at 19:16

## 2021-01-09 RX ADMIN — WARFARIN SODIUM 2.5 MG: 2.5 TABLET ORAL at 17:44

## 2021-01-09 RX ADMIN — TICAGRELOR 90 MG: 90 TABLET ORAL at 08:11

## 2021-01-09 RX ADMIN — CITALOPRAM HYDROBROMIDE 20 MG: 20 TABLET ORAL at 08:11

## 2021-01-09 RX ADMIN — LANSOPRAZOLE 30 MG: 30 TABLET, ORALLY DISINTEGRATING, DELAYED RELEASE ORAL at 19:16

## 2021-01-09 RX ADMIN — FUROSEMIDE 40 MG: 40 TABLET ORAL at 15:51

## 2021-01-09 RX ADMIN — ASPIRIN 81 MG CHEWABLE TABLET 81 MG: 81 TABLET CHEWABLE at 08:11

## 2021-01-09 RX ADMIN — ATORVASTATIN CALCIUM 40 MG: 40 TABLET, FILM COATED ORAL at 19:16

## 2021-01-09 RX ADMIN — LANSOPRAZOLE 30 MG: 30 TABLET, ORALLY DISINTEGRATING, DELAYED RELEASE ORAL at 08:11

## 2021-01-09 ASSESSMENT — ACTIVITIES OF DAILY LIVING (ADL)
ADLS_ACUITY_SCORE: 22
ADLS_ACUITY_SCORE: 23
ADLS_ACUITY_SCORE: 23
ADLS_ACUITY_SCORE: 22
ADLS_ACUITY_SCORE: 22
ADLS_ACUITY_SCORE: 23

## 2021-01-09 ASSESSMENT — MIFFLIN-ST. JEOR: SCORE: 1645.86

## 2021-01-09 NOTE — PHARMACY-ANTICOAGULATION SERVICE
Clinical Pharmacy - Warfarin Dosing Consult     Pharmacy has been consulted to manage this patient s warfarin therapy.  Indication: Other - specify in comments(LV thrombus)  Therapy Goal: INR 2-3  Warfarin Prior to Admission: No  Significant drug interactions: Brilinta, asa,  Dose Comments: recent bleed    INR   Date Value Ref Range Status   01/09/2021 1.27 (H) 0.86 - 1.14 Final   01/02/2021 1.20 (H) 0.86 - 1.14 Final       Recommend warfarin 2.5 mg today.  Pharmacy will monitor Willy Harrell daily and order warfarin doses to achieve specified goal.      Please contact pharmacy as soon as possible if the warfarin needs to be held for a procedure or if the warfarin goals change.        Eric Altman, Pharm.D, BCPS

## 2021-01-09 NOTE — PROGRESS NOTES
D: Admitted 1/2 from [facility] post-STEMI s/p PCI to LAD. C/b hypoxic respiratory failure 2/2 to pulmonary edema, LLE weakness with CVA, sustained VT s/p ICD. Hx of type 2 diabetes with proliferative DR, HTN, vertigo, and osteomyelitis.  ?  I/A : Monitored vitals and assessed pt status. A0x4. VSS. SR with PVCs, occasionally paced. Afebrile. Zafar in place due to retention. Denies pain, SOB. Complains of vertigo related to changes in position, causing brief nausea. Refused full skin assessment this AM and with reapproach. Refusing repositioning off/on, states he is working at repositioning himself. Patient unable to tolerate turn to check skin on back side, denies pain, numbness or tingling around coccyx, educated on steps to prevent pressure ulcers around bony prominences. Heels floated. Declined warfarin pamphlet, declined education via [facility], will reapproach. Warfarin teaching done briefly at bedside ie avoid eating green leafy veggies/food high in Vitamin K intermittent, watch for signs of bleeding and when to call doctor, monitor INR and importance of following dosing instructions.   ?  P: Continue to monitor Pt status and report changes to treatment team.

## 2021-01-09 NOTE — PLAN OF CARE
Neuro: A&Ox4. Blind in left eye. Left leg weakness.  Cardiac: SR. VSS.   Respiratory: Oxygen weaned to 1/2 LPM. Sats 90s.  GI/: Zafar with adequate output. No BM this shift.  Diet/appetite: On 2 gram Na diet with 1.8L FR.  Activity:  Refusing to get out of bed or reposition.   Pain: Denies pain.  Skin: Patient refused to turn to side to assess skin. Pulsate mattress ordered.  LDA's: PIVs saline locked.    Plan: Continue with POC. Notify primary team with changes.

## 2021-01-09 NOTE — PROGRESS NOTES
[facility]     Cardiology History and Physical - Cards 2    Date of Admission: 1/2/2021    Assessment & Plan: HVSL    Willy Harrell is a 60 year old male admitted on 1/2/2021. He has PMhx of T2DM with proliferative DR, HTN, osteomyelitis who was transferred to [facility] from [facility] for cardiac intervention for late-presentation of anterior STEMI (12/22/2020) now s/p PCI to LAD/Diag. Initially in cardiogenic shock secondary to ICM with HFrEF, weaned off IABP and pressors by 12/23. Hospital course complicated by hypoxic respiratory failure secondary to pulmonary edema, LLE weakness with scattered acute/subacute ischemic lesion on MRI 12/27, sustained VT s/p ICD 12/31. Transferred to [facility] (1/2/2021) for further management and possible advanced heart failure therapy evaluation.    Changes 1/8  - continue lasix 40 PO BID  - switch to lisinopril 5 mg daily  - continue dual antiplatelet  - continue to hold heparin given recent bleeding. No worsening bleeding, will discuss tomorrow regarding starting back on AC     CAD with anterior STEMI, s/p DESx2 to prox and mid LAD + POBA of large D2 12/22/2020  Cardiogenic shock (resolved) 2/2 acute decompensated HFrEF from ICM  Sustained VT s/p ICD  Moderate MR  EF 25-40% on 01/03. Overall, he appears to have recovered some LVEF function based on echo results. 01/05 RHC: RA 9, PA 49/20 (30), PCWP 20, RV 50 50/6 (9). Working on optimizing medical therapy. If this does not result in significant improvement in cardiac function, will consider inotrope and advance heart failure therapies.   - switch captopril 12.5 mg TID to lisinopril 5 mg daily  - Continue PO lasix 40 mg BID, I/O goal net even   - Holding BB and spironolactone for now   - BMPs, strict I/Os, fluid restriction, low salt diet   - Continue ASA, Ticagrelor and Atorvastatin   - hold heparin drip for now, given recent bleeding symptoms. Currently no active  bleeding, will consider tomorrow  - ICD interrogated 01/04; no arrhythmia, normal ICD function     Acute hypoxic respiratory failure secondary to cardiogenic pulmonary edema   Has recurrent pulmonary edema with increased O2 requirement, extubated 12/24 now stable on 3-4L NC. CXR consistent with pulmonary edema with bilateral pleural effusion. Differential includes evolving pneumonia but this is less likely. Completed a course of ceftriaxone on 12/28.  - Diuresis as above  - O2 support, keep SpO2 > 92%  - Closely monitor I/O, weight, BMP    Acute/subacute infarcts  LLE weakness  Vertigo   Developed LLE weakness after hospitalization, unclear etiology. 12/30 MRI brain (w/o contrast) at [facility] showed small foci of FLAIR hyperintensity and restricted diffusion within the anterior right frontal lobe, lateral and inferior left frontal lobe, left periatrial white matter, posterior right parietal lobe, left occipital lobe, left temporal periventricular white matter and left cerebellum, compatible with small areas of acute or subacute infarct. MRI brain w/ contrast the following day (12/31) showed no abnormal enhancement, although radiologist noted that the infarcts were better evaluated on the non-contrast MRI. Evaluated by neurology upon transfer here, deemed to be optimized from medical standpoint. He's already on DAPT and atorvastatin after recent coronary stents. 01/04 CHRISTOPHE negative for thrombus. Bilateral carotid ultrasound showed no significant stenoses.   - Neurology recommending CTA head and neck to complete stroke workup. Holding off pending improvement in renal function  - PT/OT following  - Will curbside neurology re: vertigo  - Follow-up with local neurologist ~02/08-02/15 for further evaluation per recommendation of inpatient neurology team    ZAIN secondary to cardiorenal physiology  Baseline Cr ~1, was 3.12 on admission suspected to be secondary to cardiogenic shock/ATN, and cardiorenal physiology. Cr  stable ~1.5.  - Heart failure management as above  - Closely monitor I/O, weight, BMP    PAD  Poikilothermia of R foot, per patient this has been his chronic problem. US arterial RLE (12/22) with occlusion of DPA. Patent but with slow flow within PTA. No significant stenosis within the femoral-popliteal segment. US DVT (12/22) with no evidence of DVT in RLE. No pain, intact motor power, and sensation, less concerns for acute limb ischemia (also given negative lactate and CK) and more consistent with PAD with chronic arterial insufficiency.  - Vascular surgery consulted, no surgical intervention at this time    T2DM with micro and macrovascular complications  Noted to have PDR. HgbA1c 6.2% on 12/22.  - BS AC&HS, target 140-180 mg/dL  - mSSI  - Hypoglycemia protocol    Congestive hepatopathy: Improving, trend LFTs  Concern for GIB: Blood from OG tube on admission at [facility], Hgb stable, no recurrent bleeding. Continue Lansoprazole  Urinary retention: Zafar placed 12/28  Anxiety: Continue Celexa 20 mg daily     Diet: Cardiac diet with fluid restriction  DVT Prophylaxis: Enoxaparin (Lovenox) SQ  Zafar Catheter: in place, indication: Retention  Code Status: FULL code  Fluids: None  Lines: PIVs    Disposition Plan   Expected discharge: 4 - 7 days, recommended to prior living arrangement once fluid volume status optimized on oral medication and cardiac function improves.    Entered: Kelechi Oliveros MD 01/08/2021, 7:08 PM    Omid Olson MD  Internal Medicine, PGY-1      I have reviewed today's vital signs, notes, medications, labs and imaging. I have also seen and examined the patient and agree with the findings and plan as outlined above.    Deidre Macdonald MD  Section Head - Advanced Heart Failure, Transplantation and Mechanical Circulatory Support  Director - Adult Congenital and Cardiovascular [facility]etics Center  Associate Professor of Medicine, [employer]   [place]  ______________________________________________________________________    Subjective:  - Denies any dizziness. Denies any nauseous feeling. However, still has multiple dry heaves this AM, happens when he moves vertically.  - No worsening pain at the ICD site   - Denies fever, chills, chest pain, palpitation, shortness of breath, or lightheadedness.     Review of Systems    The 4 point Review of Systems is negative other than noted in the HPI or here.    Past Medical History    I have reviewed this patient's medical history and updated it with pertinent information if needed.  Past medical history is as described above.    Past Surgical History   I have reviewed this patient's surgical history and updated it with pertinent information if needed.  Past Surgical History:   Procedure Laterality Date     CV RIGHT HEART CATH N/A 1/5/2021    Procedure: Right Heart Cath;  Surgeon: Quinten Oden MD;  Location:  HEART CARDIAC CATH LAB     TRANSESOPHAGEAL ECHOCARDIOGRAM INTRAOPERATIVE N/A 1/4/2021    Procedure: ECHOCARDIOGRAM, TRANSESOPHAGEAL, INTRAOPERATIVE;  Surgeon: GENERIC ANESTHESIA PROVIDER;  Location:  OR       Social History   I have reviewed this patient's social history and updated it with pertinent information if needed.  Social History     Tobacco Use     Smoking status: None   Substance Use Topics     Alcohol use: None     Drug use: None     Family History   I have reviewed this patient's family history and updated it with pertinent information if needed. Noncontributory.    Allergies   No Known Allergies    Physical Exam   Vital Signs: Temp: 98.6  F (37  C) Temp src: Oral BP: 108/70 Pulse: 98   Resp: 18 SpO2: 97 % O2 Device: Nasal cannula Oxygen Delivery: 1 LPM  Weight: 162 lbs 0 oz  Constitutional: awake, alert, cooperative  Eyes: Ptosis of left eye (at baseline per patient) with cataracts and non-react pupil  Neck: JVP ~8 cm  Respiratory: No increased work of breathing, clear  both lungs, no wheezing  Cardiovascular: Regular rate and rhythm, normal S1 and S2, soft systolic murmur best heard at apex  GI: soft, non-tender, non-distended, normal bowel sounds   Genitounirinary: Zafar catheter in place   Skin: no bruising, bleeding or worrisome skin lesions   Ext: No lower extremity edema. Unchanged poikilothermia of R foot with no pain on active/passive movement, intact sensation and motor function.    Neurologic: Awake, alert. Motor exam unchanged compared to yesterday 1/6    Data   Data reviewed today: I reviewed all medications, new labs and imaging results over the last 24 hours and discussed as pertinent in assessment and plan.    Recent Labs   Lab 01/08/21  1647 01/08/21  0549 01/07/21  1615 01/06/21  0153 01/06/21  0153 01/05/21  0513 01/05/21  0513 01/04/21  1711 01/04/21  1711 01/04/21  0536 01/02/21  2130 01/02/21 2130   WBC  --   --   --   --  9.2  --   --   --  9.3 12.2*   < > 14.4*   HGB  --   --   --   --  10.9*  --   --   --  10.5* 10.8*   < > 11.7*   MCV  --   --   --   --  100  --   --   --  101* 99   < > 98   PLT  --  193  --   --  197  --  193  --  202 202   < > 214   INR  --   --   --   --   --   --   --   --   --   --   --  1.20*    138 137   < >  --    < > 139   < > 140 141   < > 138   POTASSIUM 3.7 3.7 3.7   < >  --    < > 4.1   < > 4.0 4.0   < > 4.2   CHLORIDE 99 100 100   < >  --    < > 102   < > 103 103   < > 101   CO2 32 33* 34*   < >  --    < > 31   < > 31 32   < > 31   BUN 28 28 28   < >  --    < > 34*   < > 34* 35*   < > 36*   CR 1.37* 1.39* 1.36*   < >  --    < > 1.57*   < > 1.53* 1.49*   < > 1.47*   ANIONGAP 5 5 4   < >  --    < > 6   < > 6 7   < > 6   CAMILLE 8.4* 8.6 8.4*   < >  --    < > 8.6   < > 8.5 8.6   < > 8.5   * 137* 191*   < >  --    < > 152*   < > 124* 136*   < > 149*   ALBUMIN 2.4* 2.4* 2.3*   < >  --    < > 2.3*  --  2.2* 2.2*   < > 2.4*   PROTTOTAL 6.0* 5.8* 5.6*   < >  --    < > 5.7*  --  5.8* 5.6*   < > 6.1*   BILITOTAL 0.8 1.1 0.8    < >  --    < > 0.8  --  0.7 0.8   < > 0.8   ALKPHOS 97 90 90   < >  --    < > 99  --  92 105   < > 116   ALT 68 65 67   < >  --    < > 81*  --  72* 81*   < > 106*   AST 38 38 37   < >  --    < > 58*  --  34 39   < > 51*    < > = values in this interval not displayed.

## 2021-01-09 NOTE — PLAN OF CARE
D:pt with angry, blunted affect, states he has on go nausea and vertigo, does not want to be assessed turn or move  I:explained the importance of repositioning and assessment  , defers medications  A:pt did allow pillow under hip with encouragement  P:speciality bed ordered

## 2021-01-10 LAB
ALBUMIN SERPL-MCNC: 2.2 G/DL (ref 3.4–5)
ALBUMIN SERPL-MCNC: 2.4 G/DL (ref 3.4–5)
ALP SERPL-CCNC: 89 U/L (ref 40–150)
ALP SERPL-CCNC: 94 U/L (ref 40–150)
ALT SERPL W P-5'-P-CCNC: 54 U/L (ref 0–70)
ALT SERPL W P-5'-P-CCNC: 55 U/L (ref 0–70)
ANION GAP SERPL CALCULATED.3IONS-SCNC: 6 MMOL/L (ref 3–14)
ANION GAP SERPL CALCULATED.3IONS-SCNC: 7 MMOL/L (ref 3–14)
AST SERPL W P-5'-P-CCNC: 50 U/L (ref 0–45)
AST SERPL W P-5'-P-CCNC: 52 U/L (ref 0–45)
BILIRUB DIRECT SERPL-MCNC: 0.2 MG/DL (ref 0–0.2)
BILIRUB DIRECT SERPL-MCNC: 0.3 MG/DL (ref 0–0.2)
BILIRUB SERPL-MCNC: 1 MG/DL (ref 0.2–1.3)
BILIRUB SERPL-MCNC: 1.1 MG/DL (ref 0.2–1.3)
BUN SERPL-MCNC: 28 MG/DL (ref 7–30)
BUN SERPL-MCNC: 31 MG/DL (ref 7–30)
CALCIUM SERPL-MCNC: 8.4 MG/DL (ref 8.5–10.1)
CALCIUM SERPL-MCNC: 8.6 MG/DL (ref 8.5–10.1)
CHLORIDE SERPL-SCNC: 100 MMOL/L (ref 94–109)
CHLORIDE SERPL-SCNC: 100 MMOL/L (ref 94–109)
CO2 SERPL-SCNC: 30 MMOL/L (ref 20–32)
CO2 SERPL-SCNC: 31 MMOL/L (ref 20–32)
CREAT SERPL-MCNC: 1.44 MG/DL (ref 0.66–1.25)
CREAT SERPL-MCNC: 1.57 MG/DL (ref 0.66–1.25)
GFR SERPL CREATININE-BSD FRML MDRD: 47 ML/MIN/{1.73_M2}
GFR SERPL CREATININE-BSD FRML MDRD: 52 ML/MIN/{1.73_M2}
GLUCOSE BLDC GLUCOMTR-MCNC: 135 MG/DL (ref 70–99)
GLUCOSE BLDC GLUCOMTR-MCNC: 150 MG/DL (ref 70–99)
GLUCOSE BLDC GLUCOMTR-MCNC: 153 MG/DL (ref 70–99)
GLUCOSE BLDC GLUCOMTR-MCNC: 163 MG/DL (ref 70–99)
GLUCOSE BLDC GLUCOMTR-MCNC: 201 MG/DL (ref 70–99)
GLUCOSE SERPL-MCNC: 135 MG/DL (ref 70–99)
GLUCOSE SERPL-MCNC: 166 MG/DL (ref 70–99)
INR PPP: 1.29 (ref 0.86–1.14)
POTASSIUM SERPL-SCNC: 3.7 MMOL/L (ref 3.4–5.3)
POTASSIUM SERPL-SCNC: 4 MMOL/L (ref 3.4–5.3)
PROT SERPL-MCNC: 5.9 G/DL (ref 6.8–8.8)
PROT SERPL-MCNC: 6.1 G/DL (ref 6.8–8.8)
SODIUM SERPL-SCNC: 137 MMOL/L (ref 133–144)
SODIUM SERPL-SCNC: 137 MMOL/L (ref 133–144)

## 2021-01-10 PROCEDURE — 36415 COLL VENOUS BLD VENIPUNCTURE: CPT | Performed by: INTERNAL MEDICINE

## 2021-01-10 PROCEDURE — 80048 BASIC METABOLIC PNL TOTAL CA: CPT | Performed by: INTERNAL MEDICINE

## 2021-01-10 PROCEDURE — 250N000013 HC RX MED GY IP 250 OP 250 PS 637: Performed by: STUDENT IN AN ORGANIZED HEALTH CARE EDUCATION/TRAINING PROGRAM

## 2021-01-10 PROCEDURE — 80076 HEPATIC FUNCTION PANEL: CPT | Performed by: INTERNAL MEDICINE

## 2021-01-10 PROCEDURE — 250N000013 HC RX MED GY IP 250 OP 250 PS 637: Performed by: INTERNAL MEDICINE

## 2021-01-10 PROCEDURE — 85610 PROTHROMBIN TIME: CPT | Performed by: INTERNAL MEDICINE

## 2021-01-10 PROCEDURE — 999N001017 HC STATISTIC GLUCOSE BY METER IP

## 2021-01-10 PROCEDURE — 214N000001 HC R&B CCU UMMC

## 2021-01-10 PROCEDURE — 99232 SBSQ HOSP IP/OBS MODERATE 35: CPT | Mod: GC | Performed by: INTERNAL MEDICINE

## 2021-01-10 RX ORDER — WARFARIN SODIUM 2.5 MG/1
2.5 TABLET ORAL
Status: COMPLETED | OUTPATIENT
Start: 2021-01-10 | End: 2021-01-10

## 2021-01-10 RX ORDER — TORSEMIDE 20 MG/1
40 TABLET ORAL
Status: DISCONTINUED | OUTPATIENT
Start: 2021-01-10 | End: 2021-01-11

## 2021-01-10 RX ADMIN — ASPIRIN 81 MG CHEWABLE TABLET 81 MG: 81 TABLET CHEWABLE at 07:44

## 2021-01-10 RX ADMIN — FUROSEMIDE 40 MG: 40 TABLET ORAL at 07:44

## 2021-01-10 RX ADMIN — TORSEMIDE 40 MG: 20 TABLET ORAL at 12:05

## 2021-01-10 RX ADMIN — CITALOPRAM HYDROBROMIDE 20 MG: 20 TABLET ORAL at 07:44

## 2021-01-10 RX ADMIN — DIGOXIN 125 MCG: 125 TABLET ORAL at 07:44

## 2021-01-10 RX ADMIN — TORSEMIDE 40 MG: 20 TABLET ORAL at 17:31

## 2021-01-10 RX ADMIN — WARFARIN SODIUM 2.5 MG: 2.5 TABLET ORAL at 17:32

## 2021-01-10 RX ADMIN — LANSOPRAZOLE 30 MG: 30 TABLET, ORALLY DISINTEGRATING, DELAYED RELEASE ORAL at 07:44

## 2021-01-10 RX ADMIN — LANSOPRAZOLE 30 MG: 30 TABLET, ORALLY DISINTEGRATING, DELAYED RELEASE ORAL at 20:08

## 2021-01-10 RX ADMIN — LISINOPRIL 5 MG: 5 TABLET ORAL at 17:32

## 2021-01-10 RX ADMIN — ATORVASTATIN CALCIUM 40 MG: 40 TABLET, FILM COATED ORAL at 20:08

## 2021-01-10 RX ADMIN — TICAGRELOR 90 MG: 90 TABLET ORAL at 07:45

## 2021-01-10 RX ADMIN — TICAGRELOR 90 MG: 90 TABLET ORAL at 20:08

## 2021-01-10 ASSESSMENT — ACTIVITIES OF DAILY LIVING (ADL)
ADLS_ACUITY_SCORE: 21
ADLS_ACUITY_SCORE: 21
ADLS_ACUITY_SCORE: 22
ADLS_ACUITY_SCORE: 21
ADLS_ACUITY_SCORE: 22
ADLS_ACUITY_SCORE: 22

## 2021-01-10 ASSESSMENT — MIFFLIN-ST. JEOR: SCORE: 1655.84

## 2021-01-10 NOTE — PROGRESS NOTES
Care Management Follow Up    Length of Stay (days): 8    Expected Discharge Date: 01/11/21     Concerns to be Addressed: discharge planning     Patient plan of care discussed at interdisciplinary rounds: No    Anticipated Discharge Disposition: Transitional Care     Anticipated Discharge Services: Discharge transportation  Anticipated Discharge DME: None    Patient/family educated on Medicare website which has current facility and service quality ratings: yes  Education Provided on the Discharge Plan:  Yes   Patient/Family in Agreement with the Plan: yes    Referrals Placed by CM/SW: [facility]    - [facility] [phone] - No admissions staff on over the weekend  - [facility] [phone] - No admissions staff available over the weekend     Private pay costs discussed: Not applicable    Additional Information:  SW aware that pt is ready for discharge today. Contacted facilities where referrals were made on Friday, after being identified as in network facilities, and neither facility has weekend admissions staff. Weekday  will need to call facilities tomorrow.       MEREDITH CunhaSW

## 2021-01-10 NOTE — PROGRESS NOTES
[facility]     Cardiology History and Physical - Cards 2    Date of Admission: 1/2/2021    Assessment & Plan: HVSL    Willy Harrell is a 60 year old male admitted on 1/2/2021. He has PMhx of T2DM with proliferative DR, HTN, osteomyelitis who was transferred to [facility] from [facility] for cardiac intervention for late-presentation of anterior STEMI (12/22/2020) now s/p PCI to LAD/Diag. Initially in cardiogenic shock secondary to ICM with HFrEF, weaned off IABP and pressors by 12/23. Hospital course complicated by hypoxic respiratory failure secondary to pulmonary edema, LLE weakness with scattered acute/subacute ischemic lesion on MRI 12/27, sustained VT s/p ICD 12/31. Transferred to [facility] (1/2/2021) for further management and possible advanced heart failure therapy evaluation.    Changes 1/9  - start warfarin for possible LV thrombus and continue dual antiplatelet  - touched base with neurology regarding vertigo  - physical therapist for vestibular evaluation; if positional, can try reglan or ativan prn. If not positional, can try prednisone for possible vestibular neuritis if symptoms correlate.  - add reglan prn for vomiting  - waiting for placement to the TCU.  medically avoidable day starts tomorrow    CAD with anterior STEMI, s/p DESx2 to prox and mid LAD + POBA of large D2 12/22/2020  Cardiogenic shock (resolved) 2/2 acute decompensated HFrEF from ICM  Sustained VT s/p ICD  Moderate MR  EF 25-40% on 01/03. Overall, he appears to have recovered some LVEF function based on echo results. 01/05 RHC: RA 9, PA 49/20 (30), PCWP 20, RV 50 50/6 (9). Working on optimizing medical therapy. If this does not result in significant improvement in cardiac function, will consider inotrope and advance heart failure therapies.   - continue lisinopril 5 mg daily  - continue PO lasix 40 mg BID, I/O goal net even   - add warfarin for LV thrombus  - Continue ASA,  Ticagrelor and Atorvastatin   - Holding BB and spironolactone for now   - BMPs, strict I/Os, fluid restriction, low salt diet     Acute hypoxic respiratory failure secondary to cardiogenic pulmonary edema   Has recurrent pulmonary edema with increased O2 requirement, extubated 12/24 now stable on 3-4L NC. CXR consistent with pulmonary edema with bilateral pleural effusion. Differential includes evolving pneumonia but this is less likely. Completed a course of ceftriaxone on 12/28.  - Diuresis as above  - O2 support, keep SpO2 > 92%  - Closely monitor I/O, weight, BMP    Acute/subacute infarcts  LLE weakness  Vertigo   Developed LLE weakness after hospitalization, unclear etiology. 12/30 MRI brain (w/o contrast) at [facility] showed small foci of FLAIR hyperintensity and restricted diffusion within the anterior right frontal lobe, lateral and inferior left frontal lobe, left periatrial white matter, posterior right parietal lobe, left occipital lobe, left temporal periventricular white matter and left cerebellum, compatible with small areas of acute or subacute infarct. MRI brain w/ contrast the following day (12/31) showed no abnormal enhancement, although radiologist noted that the infarcts were better evaluated on the non-contrast MRI. Evaluated by neurology upon transfer here, deemed to be optimized from medical standpoint. He's already on DAPT and atorvastatin after recent coronary stents. 01/04 CHRISTOPHE negative for thrombus. Bilateral carotid ultrasound showed no significant stenoses.   - Neurology recommending CTA head and neck to complete stroke workup. Holding off pending improvement in renal function  - PT/OT following  - touched base with neurology regarding vertigo  - physical therapist for vestibular evaluation   - add reglan prn for vomiting  - Follow-up with local neurologist ~02/08-02/15 for further evaluation per recommendation of inpatient neurology team    ZAIN secondary to cardiorenal  physiology  Baseline Cr ~1, was 3.12 on admission suspected to be secondary to cardiogenic shock/ATN, and cardiorenal physiology. Cr stable ~1.5.  - Heart failure management optimized   - Closely monitor I/O, weight, BMP    PAD  Poikilothermia of R foot, per patient this has been his chronic problem. US arterial RLE (12/22) with occlusion of DPA. Patent but with slow flow within PTA. No significant stenosis within the femoral-popliteal segment. US DVT (12/22) with no evidence of DVT in RLE. No pain, intact motor power, and sensation, less concerns for acute limb ischemia (also given negative lactate and CK) and more consistent with PAD with chronic arterial insufficiency.  - Vascular surgery consulted, no surgical intervention at this time    T2DM with micro and macrovascular complications  Noted to have PDR. HgbA1c 6.2% on 12/22.  - BS AC&HS, target 140-180 mg/dL  - mSSI  - Hypoglycemia protocol    Congestive hepatopathy: Improving, trend LFTs  Concern for GIB: Blood from OG tube on admission at [facility], Hgb stable, no recurrent bleeding. Continue Lansoprazole  Urinary retention: Zafar placed 12/28  Anxiety: Continue Celexa 20 mg daily     Diet: Cardiac diet with fluid restriction  DVT Prophylaxis: Enoxaparin (Lovenox) SQ  Zafar Catheter: in place, indication: Retention  Code Status: FULL code  Fluids: None  Lines: PIVs    Disposition Plan   Expected discharge: 2-3 days, recommended to prior living arrangement once fluid volume status optimized on oral medication and cardiac function improves.    Entered: Kelechi Oliveros MD 01/09/2021, 6:43 PM    Omid Olson MD  Internal Medicine, PGY-1      I have reviewed today's vital signs, notes, medications, labs and imaging. I have also seen and examined the patient and agree with the findings and plan as outlined above.    Deidre Macdonald MD  Section Head - Advanced Heart Failure, Transplantation and Mechanical Circulatory Support  Director - Adult Congenital  and Cardiovascular Genetics Center  Associate Professor of Medicine, [facility]  ______________________________________________________________________    Subjective:  - Still has multiple dry heaves this AM, happens when he sits up and lies down on his bed  - No worsening pain at the ICD site   - Denies fever, chills, chest pain, palpitation, shortness of breath, or lightheadedness.     Review of Systems    The 4 point Review of Systems is negative other than noted in the HPI or here.    Past Medical History    I have reviewed this patient's medical history and updated it with pertinent information if needed.  Past medical history is as described above.    Past Surgical History   I have reviewed this patient's surgical history and updated it with pertinent information if needed.  Past Surgical History:   Procedure Laterality Date     CV RIGHT HEART CATH N/A 1/5/2021    Procedure: Right Heart Cath;  Surgeon: Quinten Oden MD;  Location:  HEART CARDIAC CATH LAB     TRANSESOPHAGEAL ECHOCARDIOGRAM INTRAOPERATIVE N/A 1/4/2021    Procedure: ECHOCARDIOGRAM, TRANSESOPHAGEAL, INTRAOPERATIVE;  Surgeon: GENERIC ANESTHESIA PROVIDER;  Location:  OR       Social History   I have reviewed this patient's social history and updated it with pertinent information if needed.  Social History     Tobacco Use     Smoking status: None   Substance Use Topics     Alcohol use: None     Drug use: None     Family History   I have reviewed this patient's family history and updated it with pertinent information if needed. Noncontributory.    Allergies   No Known Allergies    Physical Exam   Vital Signs: Temp: 98  F (36.7  C) Temp src: Oral BP: 106/60 Pulse: 74   Resp: 16 SpO2: 97 % O2 Device: Nasal cannula Oxygen Delivery: 1/2 LPM  Weight: 163 lbs 0 oz  Constitutional: awake, alert, cooperative  Eyes: Ptosis of left eye (at baseline per patient) with cataracts and non-react pupil  Neck: JVP ~8 cm  Respiratory:  No increased work of breathing, clear both lungs, no wheezing  Cardiovascular: Regular rate and rhythm, normal S1 and S2, soft systolic murmur best heard at apex  GI: soft, non-tender, non-distended, normal bowel sounds   Genitounirinary: Zafar catheter in place   Skin: no bruising, bleeding or worrisome skin lesions   Ext: No lower extremity edema. Unchanged poikilothermia of R foot with no pain on active/passive movement, intact sensation and motor function.    Neurologic: Awake, alert. Motor exam unchanged compared to yesterday 1/6    Data   Data reviewed today: I reviewed all medications, new labs and imaging results over the last 24 hours and discussed as pertinent in assessment and plan.    Recent Labs   Lab 01/09/21  1711 01/09/21  1430 01/09/21  0510 01/08/21  1647 01/08/21  0549 01/06/21  0153 01/06/21  0153 01/05/21  0513 01/05/21  0513 01/04/21  1711 01/04/21  1711 01/04/21  0536 01/02/21  2130 01/02/21  2130   WBC  --   --   --   --   --   --  9.2  --   --   --  9.3 12.2*   < > 14.4*   HGB  --   --   --   --   --   --  10.9*  --   --   --  10.5* 10.8*   < > 11.7*   MCV  --   --   --   --   --   --  100  --   --   --  101* 99   < > 98   PLT  --   --   --   --  193  --  197  --  193  --  202 202   < > 214   INR  --  1.27*  --   --   --   --   --   --   --   --   --   --   --  1.20*     --  139 137 138   < >  --    < > 139   < > 140 141   < > 138   POTASSIUM 3.7  --  3.6 3.7 3.7   < >  --    < > 4.1   < > 4.0 4.0   < > 4.2   CHLORIDE 101  --  99 99 100   < >  --    < > 102   < > 103 103   < > 101   CO2 32  --  33* 32 33*   < >  --    < > 31   < > 31 32   < > 31   BUN 27  --  28 28 28   < >  --    < > 34*   < > 34* 35*   < > 36*   CR 1.40*  --  1.44* 1.37* 1.39*   < >  --    < > 1.57*   < > 1.53* 1.49*   < > 1.47*   ANIONGAP 5  --  7 5 5   < >  --    < > 6   < > 6 7   < > 6   CAMILLE 8.3*  --  8.3* 8.4* 8.6   < >  --    < > 8.6   < > 8.5 8.6   < > 8.5   *  --  139* 185* 137*   < >  --    < > 152*    < > 124* 136*   < > 149*   ALBUMIN 2.3*  --  2.4* 2.4* 2.4*   < >  --    < > 2.3*  --  2.2* 2.2*   < > 2.4*   PROTTOTAL 5.8*  --  5.8* 6.0* 5.8*   < >  --    < > 5.7*  --  5.8* 5.6*   < > 6.1*   BILITOTAL 1.4*  --  1.3 0.8 1.1   < >  --    < > 0.8  --  0.7 0.8   < > 0.8   ALKPHOS 90  --  90 97 90   < >  --    < > 99  --  92 105   < > 116   ALT 57  --  64 68 65   < >  --    < > 81*  --  72* 81*   < > 106*   AST 54*  --  48* 38 38   < >  --    < > 58*  --  34 39   < > 51*    < > = values in this interval not displayed.

## 2021-01-10 NOTE — DISCHARGE SUMMARY
[facility] Discharge Summary    Willy Harrell MRN# [id number]   Age: 60 year old YOB: 1960     Date of Admission:  1/2/2021  Date of Discharge::  1/12/2021  Admitting Physician:  Yoly Frederick MD  Discharge Physician:  Deidre Macdonald MD           Admission Diagnoses:   Acute systolic heart failure secondary to ischemic cardiomyopathy          Discharge Diagnosis:   Acute systolic heart failure secondary to ischemic cardiomyopathy  Anterior wall STEMI s/p DESx2 to prox and mid LAD + POBA of large D2 12/22/2020  Moderate mitral valve regurgitation  Sustained ventricular tachycardia with dual chamber ICD placed on 12/31/20  Acute hypoxic respiratory failure secondary to pulmonary edema  Left lower extremity weakness  Vertigo  Type II DM with proliferative DR  Hypertension  H/o osteomyelitis         Procedures:   Imaging performed:    Ultrasound lower extremity 1/3/21                                                           Right leg: Patent right lower extremity arteries without evidence  of hemodynamic significant stenosis.  Left leg: Patent left portion of the arteries without evidence of  hemodynamically significant stenosis.  Monophasic waveforms below the  knee compatible with infrapopliteal peripheral arterial disease.    Echo 1/3/21  Ischemic cardiomyopathy.   Moderately (EF 35-40%) reduced left ventricular function is present.   Apical wall and distal anterior/septal akinesis is present.   Grade III or advanced diastolic dysfunction.   Global right ventricular function is normal.   Moderate mitral insufficiency is present. IVC diameter >2.1 cm collapsing <50% with sniff suggests a high RA pressure estimated at 15 mmHg or greater.  No pericardial effusion is present. There is no prior study for direct comparison.    RHC 1/5/21  mRA 9  RV 50/9  PA 49/20/30  PCWP--/--/20    MILTON 4.4/2.1  TD 4.4/2.1    SVR 1545  PVR 2.3    US bilateral carotid 1/5/21  Right side: Degree of stenosis of  the internal carotid artery: Less than 50 %.  Atherosclerotic plaque without elevated velocities.  Left side: Degree of stenosis of the internal carotid artery: Normal.  Atherosclerotic plaque without elevated velocities.         Medications Prior to Admission:     Medications Prior to Admission   Medication Sig Dispense Refill Last Dose     aspirin (ASA) 81 MG chewable tablet Take 81 mg by mouth daily   1/2/2021 at 0900     citalopram (CELEXA) 20 MG tablet Take 20 mg by mouth daily   1/2/2021 at 0900     finasteride (PROSCAR) 5 MG tablet Take 5 mg by mouth daily   1/2/2021 at 0900     isosorbide dinitrate (ISORDIL) 5 MG tablet Take 5 mg by mouth 3 times daily   1/1/2021 at 1700     LANsoprazole (PREVACID SOLUTAB) 30 MG ODT Take 30 mg by mouth 2 times daily   1/2/2021 at 2000     metoprolol tartrate (LOPRESSOR) 25 MG tablet Take 12.5 mg by mouth 2 times daily   1/2/2021 at 2000     ticagrelor (BRILINTA) 90 MG tablet Take 90 mg by mouth 2 times daily   1/2/2021 at 2000     atorvastatin (LIPITOR) 20 MG tablet Take 40 mg by mouth every evening    1/2/2021 at 2000     diphenhydrAMINE (BENADRYL) 25 MG tablet Take 25 mg by mouth every 6 hours as needed for itching        lisinopril (ZESTRIL) 10 MG tablet Take 5 mg by mouth daily                Discharge Medications:     Current Facility-Administered Medications   Medication     acetaminophen (TYLENOL) Suppository 650 mg     acetaminophen (TYLENOL) tablet 650 mg     aspirin (ASA) chewable tablet 81 mg     atorvastatin (LIPITOR) tablet 40 mg     citalopram (celeXA) tablet 20 mg     glucose gel 15-30 g    Or     dextrose 50 % injection 25-50 mL    Or     glucagon injection 1 mg     digoxin (LANOXIN) tablet 125 mcg     insulin aspart (NovoLOG) injection (RAPID ACTING)     insulin aspart (NovoLOG) injection (RAPID ACTING)     LANsoprazole (PREVACID SOLUTAB) ODT tab 30 mg     lidocaine (LMX4) cream     lidocaine 1 % 0.1-1 mL     lisinopril (ZESTRIL) tablet 5 mg     meclizine  (ANTIVERT) tablet 12.5 mg     medication instruction     metoclopramide (REGLAN) injection 10 mg     multivitamin w/minerals (THERA-VIT-M) tablet 1 tablet     ondansetron (ZOFRAN-ODT) ODT tab 4 mg    Or     ondansetron (ZOFRAN) injection 4 mg     oxyCODONE (ROXICODONE) tablet 5 mg     polyethylene glycol (MIRALAX) Packet 17 g     prochlorperazine (COMPAZINE) injection 10 mg    Or     prochlorperazine (COMPAZINE) tablet 10 mg    Or     prochlorperazine (COMPAZINE) suppository 25 mg     senna-docusate (SENOKOT-S/PERICOLACE) 8.6-50 MG per tablet 1 tablet    Or     senna-docusate (SENOKOT-S/PERICOLACE) 8.6-50 MG per tablet 2 tablet     sodium chloride (PF) 0.9% PF flush 3 mL     sodium chloride (PF) 0.9% PF flush 3 mL     ticagrelor (BRILINTA) tablet 90 mg     torsemide (DEMADEX) tablet 20 mg    And     torsemide (DEMADEX) tablet 40 mg     warfarin ANTICOAGULANT (COUMADIN) tablet 5 mg     Warfarin Therapy Reminder (Check START DATE - warfarin may be starting in the FUTURE)             Consultations:   Neurology consult -- evaluation of possible ischemic stroke from presentation with left lower extremity weakness with MRI lesions concerning for acute/subacute ischemic stroke.    General surgery -- evaluation for cold right foot, rule out vascular insufficiency.     -- to help with discharge planning           Brief History of Illness:   Willy Harrell is a 60 year old male who has PMhx of T2DM with proliferative DR, HTN, osteomyelitis who was transferred to [facility] from [facility] for cardiac intervention for late-presentation of anterior STEMI (12/22/2020) now s/p PCI to LAD/Diag. Initially in cardiogenic shock secondary to ICM with HFrEF (ICD placement at [facility]), weaned off IABP and pressors by 12/23.    Hospital course complicated by hypoxic respiratory failure secondary to pulmonary edema, LLE weakness with scattered acute/subacute ischemic lesion on MRI 12/27, sustained VT s/p ICD 12/31.  Transferred to [facility] (1/2/2021) for further management and possible advanced heart failure therapy evaluation.     Patient states that he feels fine, breathing is okay. Complains of generalized pain which he attributes to being transferred. Denies recent chest pain or palpitation. States that he lost his strength in L leg after he woke up in the hospital but feels that strength is improving compared to yesterday. Reports that he has cold feet for a long time, denies pain at this time. Denies fever, headache, or back pain.          Hospital Course:   Willy Harrell is a 60 year old male admitted on 1/2/2021. He has PMhx of T2DM with proliferative DR, HTN, osteomyelitis who was transferred to [facility] from [facility] for cardiac intervention for late-presentation of anterior STEMI (12/22/2020) now s/p PCI to LAD/Diag. Initially in cardiogenic shock secondary to ICM with HFrEF, ICD placement, weaned off IABP and pressors by 12/23. Hospital course complicated by hypoxic respiratory failure secondary to pulmonary edema, LLE weakness with scattered acute/subacute ischemic lesion on MRI 12/27, sustained VT s/p ICD 12/31. Transferred to [facility] (1/2/2021) for further management and possible advanced heart failure therapy evaluation.     CAD with anterior STEMI, s/p DESx2 to prox and mid LAD + POBA of large D2 12/22/2020  Cardiogenic shock (resolved) 2/2 acute decompensated HFrEF from ICM  Sustained VT s/p ICD  LV thrombus  Moderate MR  Overall, he appears to have recovered some LVEF function, LVEF 35-40%, based on echo results 1/3. RHC 1/5: mRA 9, PA 49/20 (30), mPCWP 20, RV 50 50/6 (9); MILTON CI 2.1, TD 2.1; SVR 1545; PVR 2.3. This admission mainly worked on optimizing medical therapy. Given recent cardiogenic shock we are not able to start BB inpatient but he should continue to follow up with [facility] for continued titration and optimization of his HF medications. Would recommend addition of beta blocker  in the outpatient setting.   - continue lisinopril 5 mg daily  - torsemide 40 mq AM, 20 mg PM  - warfarin for LV thrombus (started this admission, not yet therapeutic ) goal 2-3, PCP locally to set up anticoagulation clinic and INR draws  - Continue ASA 81 until 1/22/21 then discontinue for 11 months at least given recent stent  - Ticagrelor 90 mg BID  - Holding BB and spironolactone for now, BB to be started first outpatient as able   - Outpatient continued monitoring of Wt, BMPs, fluid restriction, low salt diet   - Close follow up with [facility] cardiology for medication titrations      Acute hypoxic respiratory failure secondary to cardiogenic pulmonary edema, resolving   Had recurrent pulmonary edema with increased O2 requirement, extubated 12/24 and stable. Oxygen continuously weaned and mainly on room air with occasional need of 1-2 L NC to maintain >91% sats.  CXR was consistent with pulmonary edema with bilateral pleural effusion. Completed a course of ceftriaxone on 12/28. Continued HF management as above.      Acute/subacute infarcts  LLE weakness  Vertigo   Developed LLE weakness after hospitalization, unclear etiology. 12/30 MRI brain (w/o contrast) at [facility] showed small foci of FLAIR hyperintensity and restricted diffusion within the anterior right frontal lobe, lateral and inferior left frontal lobe, left periatrial white matter, posterior right parietal lobe, left occipital lobe, left temporal periventricular white matter and left cerebellum, compatible with small areas of acute or subacute infarct. MRI brain w/ contrast the following day (12/31) showed no abnormal enhancement, although radiologist noted that the infarcts were better evaluated on the non-contrast MRI. Evaluated by neurology upon transfer here, deemed to be optimized from medical standpoint. He's already on DAPT and atorvastatin after recent coronary stents. 01/04 CHRISTOPHE negative for thrombus. Bilateral carotid ultrasound  showed no significant stenoses. Neurology recommending CTA head and neck to complete stroke workup. Holding off pending improvement in renal function.    - PT/OT in TCU  - Meclizine added this admission for vertigo   - Follow-up with local neurologist ~02/08-02/15 for further evaluation per recommendation of inpatient neurology team     ZAIN secondary to cardiorenal physiology  Baseline Cr ~1, was 3.12 on admission suspected to be secondary to cardiogenic shock/ATN, and cardiorenal physiology. Cr stable ~1.5 prior to discharge. Continued monitoring outpatient per PCP.     PAD  Poikilothermia of R foot, per patient this has been his chronic problem. US arterial RLE (12/22) with occlusion of DPA. Patent but with slow flow within PTA. No significant stenosis within the femoral-popliteal segment. US DVT (12/22) with no evidence of DVT in RLE. No pain, intact motor power, and sensation, less concerns for acute limb ischemia (also given negative lactate and CK) and more consistent with PAD with chronic arterial insufficiency.  Vascular surgery consulted, no surgical intervention needed this admission. Continued monitoring through PCP.      T2DM with micro and macrovascular complications  Noted to have PDR. HgbA1c 6.2% on 12/22. Was on sliding scale insulin here with good control. PCP to address outpatient diabetes regimen with patient.      Congestive hepatopathy: Improved LFTs   Concern for GIB: Blood from OG tube on admission at [facility], Hgb stable, no recurrent bleeding. Continued Lansoprazole.           Discharge Instructions and Follow-Up:   Discharge diet: 2g salt   Discharge activity: Activity as tolerated  10 lbs weight restriction of the L upper extremity until 2/1/2021   Discharge follow-up: Follow up with Primary Care Physician after discharge from TCU to set up INR monitoring/anticoagulation clinic and for follow up     Follow up with Cardiology at [facility] for continued titration of heart failure  medications and medication optimization            Discharge Disposition:   Discharged to long-term care facility      Derek Bowens MD  Internal Medicine PGY-2      I,Deidre Macdonald MD, have seen and examined this patient. I have discussed with the team and agree with the findings and discharge plan. I have reviewed the hospital course with the patient and have spent 35 minutes in the process of discharge, including discharge planning with patient education, medication teaching, and the coordination of care to outpatient providers.  It has been a pleasure to participate in the care of your patient - please do not hesitate to contact me with any questions.     Deidre Macdonald MD  Section Head - Advanced Heart Failure, Transplantation and Mechanical Circulatory Support  Director - Adult Congenital and Cardiovascular [facility]etics [facility]  Associate Professor of Medicine, [facility]

## 2021-01-10 NOTE — PROGRESS NOTES
D: Admitted 1/2 from [facility] post-STEMI s/p PCI to LAD. C/b hypoxic respiratory failure 2/2 to pulmonary edema, LLE weakness with CVA, sustained VT s/p ICD. Hx of type 2 diabetes with proliferative DR, HTN, vertigo, and osteomyelitis.  ?  I/A : Monitored vitals and assessed pt status. A0x4. VSS. SR with PVCs, occasionally paced. Afebrile. Zafar in place due to retention. Patient declined full skin assessment. Declined getting up to chair this AM, reapproached at lunch and declined, patient then had short bout of nausea around 1600, declined antiemetics, requested lemon lime soda and reports relief with this. Due to this bout of nausea patient declined sitting up in chair this evening. Verbalized he is working on OT/PT exercises on own. Off/on declining repositioning. Continuing to reinforce importance of sitting up and repositioning to avoid pressure over bony prominences. Declined PM labs when approached, but agreeable to have labs drawn after dinner. Plan to contact  once patient finished eating.    P: Continue to monitor Pt status and report changes to treatment team. Discharge to TCU, referrals sent by [facility].

## 2021-01-10 NOTE — PROGRESS NOTES
"/68 (BP Location: Right arm)   Pulse 79   Temp 98.2  F (36.8  C) (Oral)   Resp 16   Ht 1.93 m (6' 4\")   Wt 73.9 kg (163 lb)   SpO2 97%   BMI 19.84 kg/m      Status: Admitted 1/2 from [facility] post-STEMI s/p PCI to LAD. C/b hypoxic respiratory failure 2/2 to pulmonary edema, LLE weakness with CVA, sustained VT s/p ICD. Hx of type 2 diabetes with proliferative DR, HTN, vertigo, and osteomyelitis.  Vitals: Within normal parameters. Tele: sinus rhythm with infrequent PVCs. Dual chamber pacing <10%  Neuros: Alert and oriented x4. Pt reports doing better with cares when he's including in schedules/plans.  RN discussed BG checks and repositioning with him and pt was cooperative with cares. Pt reports improved LLE strength: Able to lift leg up against gravity. Numbness/tingling reported at baseline. L eye blind at baseline.  IV: PIV saline locked  Labs/Electrolytes: No replacements  Resp/trach:  SpO2>95% on 1/2 L nasal cannula. Infrequent, fair cough noted.  Diet: reported finished dinner with no nausea. Nausea intermittent. Nausea triggers minimized. Pt reports vertigo when repositioning. Pt stated nausea is improved when he gradually lowers head of bed down.  Bowel status: Pt reports passing flatus. LBM 1/8  : Zafar for urinary retention.   Skin: No new concerns noted. ICD incision appears WNL ex some edema noted around site  Pain: Denied  Activity: assist of 2/lift with repositioning. Repositioning q 2 hours  Plan: Looking for TCU placement. Continue plan of care and update team with changes.  "

## 2021-01-10 NOTE — PLAN OF CARE
D AVSS with sat's 94% on oxygen set to 1/2L/min via nasal cannula. Heart regular with PVC's and occasional pacing. Lung sounds decreased in bases otherwise clear. Voiced no c/o pain or nausea overnight and slept well between cares. Producing adequate urine output via armenta catheter with no BM overnight and BG was stable.   I Vital's, assessment and med's per order.   A Resting in bed with call light in reach.    P Continue to monitor and update MD with changes.

## 2021-01-10 NOTE — PROGRESS NOTES
[facility]     Cardiology History and Physical - Cards 2    Date of Admission: 1/2/2021    Assessment & Plan: HVSL    Willy Harrell is a 60 year old male admitted on 1/2/2021. He has PMhx of T2DM with proliferative DR, HTN, osteomyelitis who was transferred to [facility] from [facility] for cardiac intervention for late-presentation of anterior STEMI (12/22/2020) now s/p PCI to LAD/Diag. Initially in cardiogenic shock secondary to ICM with HFrEF, weaned off IABP and pressors by 12/23. Hospital course complicated by hypoxic respiratory failure secondary to pulmonary edema, LLE weakness with scattered acute/subacute ischemic lesion on MRI 12/27, sustained VT s/p ICD 12/31. Transferred to [facility] (1/2/2021) for further management and possible advanced heart failure therapy evaluation.    Updates 1/10  - switch diuretic from furosemide 40 mg BID to torsemide 40 mg BID given increase in his weight, slightly up trending creatinine, and net balance urine output   - continue warfarin for possible LV thrombus, goal INR 2-3, current INR 1.29  - continue dual antiplatelet( ASA + Brilinta) for now; plan to discontinue ASA 30 days after stent was placed and continue Brilinta.    - waiting for placement to the TCU.  medically ready to discharge    CAD with anterior STEMI, s/p DESx2 to prox and mid LAD + POBA of large D2 12/22/2020  Cardiogenic shock (resolved) 2/2 acute decompensated HFrEF from ICM  Sustained VT s/p ICD  Moderate MR  EF 25-40% on 01/03. Overall, he appears to have recovered some LVEF function based on echo results. 01/05 RHC: RA 9, PA 49/20 (30), PCWP 20, RV 50 50/6 (9). Working on optimizing medical therapy, not requiring any inotropes or additional advance heart failure therapies. Currently on:   - dual antiplatelet(ASA+Brilinta)   - warfarin started 1/9 for possible LV thrombus.    - continue lisinopril 5 mg daily  - switch furosemide 40 mg BID to  torsemide 40 mg BID  - Holding BB given recent CS, and spironolactone given will try to optimize ACE & BB first. Consider optimize medication in the future.   - BMPs, strict I/Os, fluid restriction, low salt diet     Acute hypoxic respiratory failure secondary to cardiogenic pulmonary edema   Has recurrent pulmonary edema with increased O2 requirement, extubated 12/24 now stable on 3-4L NC. CXR consistent with pulmonary edema with bilateral pleural effusion. Differential includes evolving pneumonia but this is less likely. Completed a course of ceftriaxone on 12/28.  - continue with diuretic as above  - O2 support, keep SpO2 > 92%  - Closely monitor I/O, weight, BMP    Acute/subacute infarcts  LLE weakness, improving  Vertigo, chronic, improving  Developed LLE weakness after hospitalization, unclear etiology. 12/30 MRI brain (w/o contrast) at [facility] showed small foci of FLAIR hyperintensity and restricted diffusion within the anterior right frontal lobe, lateral and inferior left frontal lobe, left periatrial white matter, posterior right parietal lobe, left occipital lobe, left temporal periventricular white matter and left cerebellum, compatible with small areas of acute or subacute infarct. MRI brain w/ contrast the following day (12/31) showed no abnormal enhancement, although radiologist noted that the infarcts were better evaluated on the non-contrast MRI. Evaluated by neurology upon transfer here, deemed to be optimized from medical standpoint. He's already on DAPT and atorvastatin after recent coronary stents. 01/04 CHRISTOPHE negative for thrombus. Bilateral carotid ultrasound showed no significant stenoses.   - PT/OT following  - physical therapist for vestibular evaluation; if positional, can try reglan or ativan prn. If not positional, can touch base with neurology/try prednisone for possible vestibular neuritis if symptoms correlate.  - on zofran, compazine, and reglan prn for vomiting  - Neurology  recommending CTA head and neck to complete stroke workup. Holding off pending improvement in renal function  - Follow-up with local neurologist ~02/08-02/15 for further evaluation per recommendation of inpatient neurology team    ZAIN secondary to cardiorenal physiology, improved  Baseline Cr ~1, was 3.12 on admission suspected to be secondary to cardiogenic shock/ATN, and cardiorenal physiology. Cr stable ~1.5.  - continue Heart failure management   - Closely monitor I/O, weight, BMP    PAD  Poikilothermia of R foot, per patient this has been his chronic problem. US arterial RLE (12/22) with occlusion of DPA. Patent but with slow flow within PTA. No significant stenosis within the femoral-popliteal segment. US DVT (12/22) with no evidence of DVT in RLE. No pain, intact motor power, and sensation, less concerns for acute limb ischemia (also given negative lactate and CK) and more consistent with PAD with chronic arterial insufficiency.  - Vascular surgery consulted, no surgical intervention at this time  - improved after reevaluation    T2DM with micro and macrovascular complications  Noted to have PDR. HgbA1c 6.2% on 12/22.  - BS AC&HS, target 140-180 mg/dL  - mSSI  - Hypoglycemia protocol    Congestive hepatopathy: Improving, trend LFTs  Concern for GIB: Blood from OG tube on admission at [facility], Hgb stable, no recurrent bleeding. Continue Lansoprazole  Urinary retention: Zafar placed 12/28  Anxiety: Continue Celexa 20 mg daily     Diet: Cardiac diet with fluid restriction  DVT Prophylaxis: Enoxaparin (Lovenox) SQ  Zafar Catheter: in place, indication: Retention  Code Status: FULL code  Fluids: None  Lines: PIVs    Disposition Plan   Medically ready for discharge, recommended to TCU when bed available    Entered: Kelechi Oliveros MD 01/10/2021, 3:17 PM    Omid Olson MD  Internal Medicine, PGY-1      I have reviewed today's vital signs, notes, medications, labs and imaging. I have also seen and  examined the patient and agree with the findings and plan as outlined above.    Deidre Macdonald MD  Section Head - Advanced Heart Failure, Transplantation and Mechanical Circulatory Support  Director - Adult Congenital and Cardiovascular Genetics Center  Associate Professor of Medicine, [facility]  ______________________________________________________________________    Subjective:  - was able to eat this AM, looks more comfortable, seems to have less dry heaving  - gain more strengt in his left lower extremities  - Denies fever, chills, chest pain, palpitation, shortness of breath, or lightheadedness.   - no worsening pain on ICD site    Review of Systems    The 4 point Review of Systems is negative other than noted in the HPI or here.    Past Medical History    I have reviewed this patient's medical history and updated it with pertinent information if needed.  Past medical history is as described above.    Past Surgical History   I have reviewed this patient's surgical history and updated it with pertinent information if needed.  Past Surgical History:   Procedure Laterality Date     CV RIGHT HEART CATH N/A 1/5/2021    Procedure: Right Heart Cath;  Surgeon: Quinten Oden MD;  Location:  HEART CARDIAC CATH LAB     TRANSESOPHAGEAL ECHOCARDIOGRAM INTRAOPERATIVE N/A 1/4/2021    Procedure: ECHOCARDIOGRAM, TRANSESOPHAGEAL, INTRAOPERATIVE;  Surgeon: GENERIC ANESTHESIA PROVIDER;  Location:  OR       Social History   I have reviewed this patient's social history and updated it with pertinent information if needed.  Social History     Tobacco Use     Smoking status: None   Substance Use Topics     Alcohol use: None     Drug use: None     Family History   I have reviewed this patient's family history and updated it with pertinent information if needed. Noncontributory.    Allergies   No Known Allergies    Physical Exam   Vital Signs: Temp: 98.1  F (36.7  C) Temp src: Oral BP: 98/68 Pulse:  80   Resp: 16 SpO2: 94 % O2 Device: None (Room air) Oxygen Delivery: 1/2 LPM  Weight: 165 lbs 3.2 oz  Constitutional: awake, alert, cooperative  Eyes: Ptosis of left eye (at baseline per patient) with cataracts and non-react pupil  Neck: JVP ~8 cm  Respiratory: No increased work of breathing, crackles RLL, no wheezing  Cardiovascular: Regular rate and rhythm, normal S1 and S2, soft systolic murmur best heard at apex  GI: soft, non-tender, non-distended, normal bowel sounds   Genitounirinary: Zafar catheter in place   Skin: bruising at ICD site(improved)  Ext: No lower extremity edema. Unchanged poikilothermia of R foot with no pain on active/passive movement, intact sensation and motor function.    Neurologic: Awake, alert.   Motor exam improved compared to on admission.  Normal motor exams on bilateral UE.   Left LE: left hip flexion 3/5, knee extension 3/5, ankle dorsiflexion&plantarflexion 4/5.   Right LE: 5/5 all movements.    Data   Data reviewed today: I reviewed all medications, new labs and imaging results over the last 24 hours and discussed as pertinent in assessment and plan.    Recent Labs   Lab 01/10/21  0456 01/09/21  1711 01/09/21  1430 01/09/21  0510 01/08/21  0549 01/08/21  0549 01/06/21  0153 01/06/21  0153 01/05/21  0513 01/05/21  0513 01/04/21  1711 01/04/21  1711 01/04/21  0536   WBC  --   --   --   --   --   --   --  9.2  --   --   --  9.3 12.2*   HGB  --   --   --   --   --   --   --  10.9*  --   --   --  10.5* 10.8*   MCV  --   --   --   --   --   --   --  100  --   --   --  101* 99   PLT  --   --   --   --   --  193  --  197  --  193  --  202 202   INR 1.29*  --  1.27*  --   --   --   --   --   --   --   --   --   --     137  --  139   < > 138   < >  --    < > 139   < > 140 141   POTASSIUM 3.7 3.7  --  3.6   < > 3.7   < >  --    < > 4.1   < > 4.0 4.0   CHLORIDE 100 101  --  99   < > 100   < >  --    < > 102   < > 103 103   CO2 31 32  --  33*   < > 33*   < >  --    < > 31   < > 31 32    BUN 28 27  --  28   < > 28   < >  --    < > 34*   < > 34* 35*   CR 1.57* 1.40*  --  1.44*   < > 1.39*   < >  --    < > 1.57*   < > 1.53* 1.49*   ANIONGAP 6 5  --  7   < > 5   < >  --    < > 6   < > 6 7   CAMILLE 8.6 8.3*  --  8.3*   < > 8.6   < >  --    < > 8.6   < > 8.5 8.6   * 170*  --  139*   < > 137*   < >  --    < > 152*   < > 124* 136*   ALBUMIN 2.2* 2.3*  --  2.4*   < > 2.4*   < >  --    < > 2.3*  --  2.2* 2.2*   PROTTOTAL 5.9* 5.8*  --  5.8*   < > 5.8*   < >  --    < > 5.7*  --  5.8* 5.6*   BILITOTAL 1.0 1.4*  --  1.3   < > 1.1   < >  --    < > 0.8  --  0.7 0.8   ALKPHOS 89 90  --  90   < > 90   < >  --    < > 99  --  92 105   ALT 55 57  --  64   < > 65   < >  --    < > 81*  --  72* 81*   AST 52* 54*  --  48*   < > 38   < >  --    < > 58*  --  34 39    < > = values in this interval not displayed.

## 2021-01-11 LAB
ALBUMIN SERPL-MCNC: 2.4 G/DL (ref 3.4–5)
ALBUMIN SERPL-MCNC: 2.4 G/DL (ref 3.4–5)
ALP SERPL-CCNC: 87 U/L (ref 40–150)
ALP SERPL-CCNC: 91 U/L (ref 40–150)
ALT SERPL W P-5'-P-CCNC: 47 U/L (ref 0–70)
ALT SERPL W P-5'-P-CCNC: 49 U/L (ref 0–70)
ANION GAP SERPL CALCULATED.3IONS-SCNC: 6 MMOL/L (ref 3–14)
ANION GAP SERPL CALCULATED.3IONS-SCNC: 7 MMOL/L (ref 3–14)
AST SERPL W P-5'-P-CCNC: 37 U/L (ref 0–45)
AST SERPL W P-5'-P-CCNC: 40 U/L (ref 0–45)
BILIRUB DIRECT SERPL-MCNC: 0.2 MG/DL (ref 0–0.2)
BILIRUB DIRECT SERPL-MCNC: 0.3 MG/DL (ref 0–0.2)
BILIRUB SERPL-MCNC: 0.8 MG/DL (ref 0.2–1.3)
BILIRUB SERPL-MCNC: 1.4 MG/DL (ref 0.2–1.3)
BUN SERPL-MCNC: 32 MG/DL (ref 7–30)
BUN SERPL-MCNC: 36 MG/DL (ref 7–30)
CALCIUM SERPL-MCNC: 8.5 MG/DL (ref 8.5–10.1)
CALCIUM SERPL-MCNC: 8.7 MG/DL (ref 8.5–10.1)
CHLORIDE SERPL-SCNC: 100 MMOL/L (ref 94–109)
CHLORIDE SERPL-SCNC: 98 MMOL/L (ref 94–109)
CO2 SERPL-SCNC: 31 MMOL/L (ref 20–32)
CO2 SERPL-SCNC: 34 MMOL/L (ref 20–32)
CREAT SERPL-MCNC: 1.59 MG/DL (ref 0.66–1.25)
CREAT SERPL-MCNC: 1.63 MG/DL (ref 0.66–1.25)
GFR SERPL CREATININE-BSD FRML MDRD: 45 ML/MIN/{1.73_M2}
GFR SERPL CREATININE-BSD FRML MDRD: 46 ML/MIN/{1.73_M2}
GLUCOSE BLDC GLUCOMTR-MCNC: 127 MG/DL (ref 70–99)
GLUCOSE BLDC GLUCOMTR-MCNC: 146 MG/DL (ref 70–99)
GLUCOSE BLDC GLUCOMTR-MCNC: 153 MG/DL (ref 70–99)
GLUCOSE BLDC GLUCOMTR-MCNC: 185 MG/DL (ref 70–99)
GLUCOSE BLDC GLUCOMTR-MCNC: 186 MG/DL (ref 70–99)
GLUCOSE SERPL-MCNC: 128 MG/DL (ref 70–99)
GLUCOSE SERPL-MCNC: 157 MG/DL (ref 70–99)
INR PPP: 1.26 (ref 0.86–1.14)
LABORATORY COMMENT REPORT: NORMAL
POTASSIUM SERPL-SCNC: 3.6 MMOL/L (ref 3.4–5.3)
POTASSIUM SERPL-SCNC: 3.8 MMOL/L (ref 3.4–5.3)
PROT SERPL-MCNC: 6 G/DL (ref 6.8–8.8)
PROT SERPL-MCNC: 6.2 G/DL (ref 6.8–8.8)
SARS-COV-2 RNA RESP QL NAA+PROBE: NEGATIVE
SODIUM SERPL-SCNC: 138 MMOL/L (ref 133–144)
SODIUM SERPL-SCNC: 138 MMOL/L (ref 133–144)
SPECIMEN SOURCE: NORMAL

## 2021-01-11 PROCEDURE — 99232 SBSQ HOSP IP/OBS MODERATE 35: CPT | Mod: GC | Performed by: INTERNAL MEDICINE

## 2021-01-11 PROCEDURE — 80076 HEPATIC FUNCTION PANEL: CPT | Performed by: INTERNAL MEDICINE

## 2021-01-11 PROCEDURE — 80048 BASIC METABOLIC PNL TOTAL CA: CPT | Performed by: INTERNAL MEDICINE

## 2021-01-11 PROCEDURE — 250N000013 HC RX MED GY IP 250 OP 250 PS 637: Performed by: INTERNAL MEDICINE

## 2021-01-11 PROCEDURE — U0003 INFECTIOUS AGENT DETECTION BY NUCLEIC ACID (DNA OR RNA); SEVERE ACUTE RESPIRATORY SYNDROME CORONAVIRUS 2 (SARS-COV-2) (CORONAVIRUS DISEASE [COVID-19]), AMPLIFIED PROBE TECHNIQUE, MAKING USE OF HIGH THROUGHPUT TECHNOLOGIES AS DESCRIBED BY CMS-2020-01-R: HCPCS | Performed by: STUDENT IN AN ORGANIZED HEALTH CARE EDUCATION/TRAINING PROGRAM

## 2021-01-11 PROCEDURE — 999N001017 HC STATISTIC GLUCOSE BY METER IP

## 2021-01-11 PROCEDURE — U0005 INFEC AGEN DETEC AMPLI PROBE: HCPCS | Performed by: STUDENT IN AN ORGANIZED HEALTH CARE EDUCATION/TRAINING PROGRAM

## 2021-01-11 PROCEDURE — 85610 PROTHROMBIN TIME: CPT | Performed by: INTERNAL MEDICINE

## 2021-01-11 PROCEDURE — 250N000013 HC RX MED GY IP 250 OP 250 PS 637: Performed by: STUDENT IN AN ORGANIZED HEALTH CARE EDUCATION/TRAINING PROGRAM

## 2021-01-11 PROCEDURE — 214N000001 HC R&B CCU UMMC

## 2021-01-11 PROCEDURE — 36415 COLL VENOUS BLD VENIPUNCTURE: CPT | Performed by: INTERNAL MEDICINE

## 2021-01-11 RX ORDER — TORSEMIDE 20 MG/1
40 TABLET ORAL DAILY
Status: DISCONTINUED | OUTPATIENT
Start: 2021-01-12 | End: 2021-01-12 | Stop reason: HOSPADM

## 2021-01-11 RX ORDER — TORSEMIDE 20 MG/1
40 TABLET ORAL DAILY
Status: DISCONTINUED | OUTPATIENT
Start: 2021-01-12 | End: 2021-01-11

## 2021-01-11 RX ORDER — TORSEMIDE 20 MG/1
20 TABLET ORAL DAILY
Status: DISCONTINUED | OUTPATIENT
Start: 2021-01-11 | End: 2021-01-11

## 2021-01-11 RX ORDER — TORSEMIDE 20 MG/1
20 TABLET ORAL DAILY
Status: DISCONTINUED | OUTPATIENT
Start: 2021-01-12 | End: 2021-01-12 | Stop reason: HOSPADM

## 2021-01-11 RX ADMIN — TORSEMIDE 40 MG: 20 TABLET ORAL at 08:18

## 2021-01-11 RX ADMIN — LANSOPRAZOLE 30 MG: 30 TABLET, ORALLY DISINTEGRATING, DELAYED RELEASE ORAL at 20:47

## 2021-01-11 RX ADMIN — TICAGRELOR 90 MG: 90 TABLET ORAL at 08:18

## 2021-01-11 RX ADMIN — TICAGRELOR 90 MG: 90 TABLET ORAL at 20:46

## 2021-01-11 RX ADMIN — WARFARIN SODIUM 3.5 MG: 2.5 TABLET ORAL at 17:47

## 2021-01-11 RX ADMIN — LANSOPRAZOLE 30 MG: 30 TABLET, ORALLY DISINTEGRATING, DELAYED RELEASE ORAL at 08:18

## 2021-01-11 RX ADMIN — LISINOPRIL 5 MG: 5 TABLET ORAL at 17:47

## 2021-01-11 RX ADMIN — ASPIRIN 81 MG CHEWABLE TABLET 81 MG: 81 TABLET CHEWABLE at 08:18

## 2021-01-11 RX ADMIN — CITALOPRAM HYDROBROMIDE 20 MG: 20 TABLET ORAL at 08:18

## 2021-01-11 RX ADMIN — DIGOXIN 125 MCG: 125 TABLET ORAL at 08:18

## 2021-01-11 RX ADMIN — ATORVASTATIN CALCIUM 40 MG: 40 TABLET, FILM COATED ORAL at 20:46

## 2021-01-11 ASSESSMENT — ACTIVITIES OF DAILY LIVING (ADL)
ADLS_ACUITY_SCORE: 24
ADLS_ACUITY_SCORE: 21
ADLS_ACUITY_SCORE: 24
ADLS_ACUITY_SCORE: 21
ADLS_ACUITY_SCORE: 24
ADLS_ACUITY_SCORE: 21

## 2021-01-11 ASSESSMENT — MIFFLIN-ST. JEOR: SCORE: 1627.72

## 2021-01-11 NOTE — PROGRESS NOTES
[facility]     Cardiology History and Physical - Cards 2    Date of Admission: 1/2/2021    Assessment & Plan: HVSL    Willy Harrell is a 60 year old male admitted on 1/2/2021. He has PMhx of T2DM with proliferative DR, HTN, osteomyelitis who was transferred to [facility] from [facility] for cardiac intervention for late-presentation of anterior STEMI (12/22/2020) now s/p PCI to LAD/Diag. Initially in cardiogenic shock secondary to ICM with HFrEF, weaned off IABP and pressors by 12/23. Hospital course complicated by hypoxic respiratory failure secondary to pulmonary edema, LLE weakness with scattered acute/subacute ischemic lesion on MRI 12/27, sustained VT s/p ICD 12/31. Transferred to [facility] (1/2/2021) for further management and possible advanced heart failure therapy evaluation.    Plan today:  - Slightly volume down today so holding torsemide evening dose   - Will decrease torsemide for tomorrow and on discharge to 40 am and 20 pm   - Waiting for placement to the TCU.  medically ready to discharge  - COVID test sent for discharge planning     CAD with anterior STEMI, s/p DESx2 to prox and mid LAD + POBA of large D2 12/22/2020  Cardiogenic shock (resolved) 2/2 acute decompensated HFrEF from ICM  Sustained VT s/p ICD  Moderate MR  EF 25-40% on 01/03. Overall, he appears to have recovered some LVEF function based on echo results. 01/05 RHC: RA 9, PA 49/20 (30), PCWP 20, RV 50 50/6 (9). Working on optimizing medical therapy, not requiring any inotropes or additional advance heart failure therapies. Currently on:   - dual antiplatelet(ASA+Brilinta)   - warfarin started 1/9 for possible LV thrombus.    - lisinopril 5 mg daily   - Slightly volume down today so holding torsemide evening dose   - Will decrease torsemide for tomorrow and on discharge to 40 am and 20 pm  - Holding BB given recent CS, and spironolactone given will try to optimize ACE & BB first.  Consider optimize medication in the future.   - BMPs, strict I/Os, fluid restriction, low salt diet     Acute hypoxic respiratory failure secondary to cardiogenic pulmonary edema   Has recurrent pulmonary edema with increased O2 requirement, extubated 12/24 now stable on 3-4L NC. CXR consistent with pulmonary edema with bilateral pleural effusion. Differential includes evolving pneumonia but this is less likely. Completed a course of ceftriaxone on 12/28.  - continue with diuretic as above  - O2 support, keep SpO2 > 92%  - Closely monitor I/O, weight, BMP    Acute/subacute infarcts  LLE weakness, improving  Vertigo, chronic  Developed LLE weakness after hospitalization, unclear etiology. 12/30 MRI brain (w/o contrast) at [facility] showed small foci of FLAIR hyperintensity and restricted diffusion within the anterior right frontal lobe, lateral and inferior left frontal lobe, left periatrial white matter, posterior right parietal lobe, left occipital lobe, left temporal periventricular white matter and left cerebellum, compatible with small areas of acute or subacute infarct. MRI brain w/ contrast the following day (12/31) showed no abnormal enhancement, although radiologist noted that the infarcts were better evaluated on the non-contrast MRI. Evaluated by neurology upon transfer here, deemed to be optimized from medical standpoint. He's already on DAPT and atorvastatin after recent coronary stents. 01/04 CHRISTOPHE negative for thrombus. Bilateral carotid ultrasound showed no significant stenoses.   - PT/OT following  - physical therapist for vestibular evaluation; if positional, can try reglan or ativan prn. If not positional, can touch base with neurology/try prednisone for possible vestibular neuritis if symptoms correlate.  - on zofran, compazine, and reglan prn for vomiting  - Neurology recommending CTA head and neck to complete stroke workup. Holding off pending improvement in renal function  - Follow-up with  local neurologist ~02/08-02/15 for further evaluation per recommendation of inpatient neurology team    ZAIN secondary to cardiorenal physiology, improved  Baseline Cr ~1, was 3.12 on admission suspected to be secondary to cardiogenic shock/ATN, and cardiorenal physiology. Cr stable ~1.5.  - continue Heart failure management   - Closely monitor I/O, weight, BMP    PAD  Poikilothermia of R foot, per patient this has been his chronic problem. US arterial RLE (12/22) with occlusion of DPA. Patent but with slow flow within PTA. No significant stenosis within the femoral-popliteal segment. US DVT (12/22) with no evidence of DVT in RLE. No pain, intact motor power, and sensation, less concerns for acute limb ischemia (also given negative lactate and CK) and more consistent with PAD with chronic arterial insufficiency.  - Vascular surgery consulted, no surgical intervention at this time  - improved after reevaluation    T2DM with micro and macrovascular complications  Noted to have PDR. HgbA1c 6.2% on 12/22.  - BS AC&HS, target 140-180 mg/dL  - mSSI  - Hypoglycemia protocol    Congestive hepatopathy: Improving, trend LFTs  Concern for GIB: Blood from OG tube on admission at [facility], Hgb stable, no recurrent bleeding. Continue Lansoprazole  Urinary retention: Zafar placed 12/28  Anxiety: Continue Celexa 20 mg daily     Diet: Cardiac diet with fluid restriction  DVT Prophylaxis: Enoxaparin (Lovenox) SQ  Zafar Catheter: in place, indication: Retention  Code Status: FULL code  Fluids: None  Lines: PIVs    Disposition Plan   Medically ready for discharge, recommended to TCU when bed available    Derek Bowens MD  Internal Medicine PGY-2      I have reviewed today's vital signs, notes, medications, labs and imaging. I have also seen and examined the patient and agree with the findings and plan as outlined above.    Deidre Macdonald MD  Section Head - Advanced Heart Failure, Transplantation and Mechanical Circulatory  Support  Director - Adult Congenital and Cardiovascular Genetics Center  Associate Professor of Medicine, [facility]  ______________________________________________________________________    Subjective:  - Vertigo this AM triggered just by drinking water, nauseated from this with positional changes   - Denies fever, chills, chest pain, palpitation, shortness of breath, or lightheadedness.   - no worsening pain on ICD site    Review of Systems    The 4 point Review of Systems is negative other than noted in the HPI or here.    Past Medical History    I have reviewed this patient's medical history and updated it with pertinent information if needed. Past medical history is as described above.    Past Surgical History   I have reviewed this patient's surgical history and updated it with pertinent information if needed.  Past Surgical History:   Procedure Laterality Date     CV RIGHT HEART CATH N/A 1/5/2021    Procedure: Right Heart Cath;  Surgeon: Quinten Oden MD;  Location:  HEART CARDIAC CATH LAB     TRANSESOPHAGEAL ECHOCARDIOGRAM INTRAOPERATIVE N/A 1/4/2021    Procedure: ECHOCARDIOGRAM, TRANSESOPHAGEAL, INTRAOPERATIVE;  Surgeon: GENERIC ANESTHESIA PROVIDER;  Location:  OR       Social History   I have reviewed this patient's social history and updated it with pertinent information if needed.  Social History     Tobacco Use     Smoking status: None   Substance Use Topics     Alcohol use: None     Drug use: None     Family History   I have reviewed this patient's family history and updated it with pertinent information if needed. Noncontributory.    Allergies   No Known Allergies    Physical Exam   Vital Signs: Temp: 98.1  F (36.7  C) Temp src: Oral BP: 106/71 Pulse: 72   Resp: 16 SpO2: 98 % O2 Device: Nasal cannula Oxygen Delivery: 1 LPM  Weight: 159 lbs 0 oz  Constitutional: awake, alert, cooperative  Eyes: Ptosis of left eye (at baseline per patient) with cataracts and non-react  pupil  Neck: JVP ~8 cm  Respiratory: No increased work of breathing, crackles RLL, no wheezing  Cardiovascular: Regular rate and rhythm, normal S1 and S2, soft systolic murmur best heard at apex  GI: soft, non-tender, non-distended, normal bowel sounds   Genitounirinary: Zafar catheter in place   Skin: bruising at ICD site(improved)  Ext: No lower extremity edema. Unchanged poikilothermia of R foot with no pain on active/passive movement, intact sensation and motor function.    Neurologic: Awake, alert.   Motor exam improved compared to on admission.  Normal motor exams on bilateral UE.   Left LE: left hip flexion 3/5, knee extension 3/5, ankle dorsiflexion&plantarflexion 4/5.   Right LE: 5/5 all movements.    Data   Data reviewed today: I reviewed all medications, new labs and imaging results over the last 24 hours and discussed as pertinent in assessment and plan.    Recent Labs   Lab 01/11/21  0501 01/10/21  2016 01/10/21  0456 01/09/21  1430 01/09/21  1430 01/08/21  0549 01/08/21  0549 01/06/21  0153 01/06/21  0153 01/05/21  0513 01/05/21  0513 01/04/21  1711 01/04/21  1711   WBC  --   --   --   --   --   --   --   --  9.2  --   --   --  9.3   HGB  --   --   --   --   --   --   --   --  10.9*  --   --   --  10.5*   MCV  --   --   --   --   --   --   --   --  100  --   --   --  101*   PLT  --   --   --   --   --   --  193  --  197  --  193  --  202   INR 1.26*  --  1.29*  --  1.27*  --   --   --   --   --   --   --   --     137 137   < >  --    < > 138   < >  --    < > 139   < > 140   POTASSIUM 3.6 4.0 3.7   < >  --    < > 3.7   < >  --    < > 4.1   < > 4.0   CHLORIDE 100 100 100   < >  --    < > 100   < >  --    < > 102   < > 103   CO2 31 30 31   < >  --    < > 33*   < >  --    < > 31   < > 31   BUN 32* 31* 28   < >  --    < > 28   < >  --    < > 34*   < > 34*   CR 1.59* 1.44* 1.57*   < >  --    < > 1.39*   < >  --    < > 1.57*   < > 1.53*   ANIONGAP 7 7 6   < >  --    < > 5   < >  --    < > 6   < > 6    CAMILLE 8.7 8.4* 8.6   < >  --    < > 8.6   < >  --    < > 8.6   < > 8.5   * 166* 135*   < >  --    < > 137*   < >  --    < > 152*   < > 124*   ALBUMIN 2.4* 2.4* 2.2*   < >  --    < > 2.4*   < >  --    < > 2.3*  --  2.2*   PROTTOTAL 6.0* 6.1* 5.9*   < >  --    < > 5.8*   < >  --    < > 5.7*  --  5.8*   BILITOTAL 1.4* 1.1 1.0   < >  --    < > 1.1   < >  --    < > 0.8  --  0.7   ALKPHOS 87 94 89   < >  --    < > 90   < >  --    < > 99  --  92   ALT 49 54 55   < >  --    < > 65   < >  --    < > 81*  --  72*   AST 40 50* 52*   < >  --    < > 38   < >  --    < > 58*  --  34    < > = values in this interval not displayed.

## 2021-01-11 NOTE — PROGRESS NOTES
Care Management Follow Up     Length of Stay (days): 9     Expected Discharge Date: medically stable pending placement     Concerns to be Addressed: discharge planning     Patient plan of care discussed at interdisciplinary rounds: No     Anticipated Discharge Disposition: Transitional Care     Anticipated Discharge Services: Discharge transportation  Anticipated Discharge DME: None     Patient/family educated on Medicare website which has current facility and service quality ratings: yes  Education Provided on the Discharge Plan:  Yes   Patient/Family in Agreement with the Plan: yes     Referrals Placed by CM/SW: Post Acute Facilities     Private pay costs discussed: Yes- transportation     Additional Information:  Pt is medically ready for discharge. Following up on TCU referrals. No determinations made yet on either referral.     Received 2 VMs from pt's spouse Jaky (ph [phone]) this weekend stating she believes [facility] rehab is in-network with pt's insurance. Looked again at in-network TCUs and [facility] TCU is not in-network. Updated pt's spouse. Discussed discharge transportation and that this may be private-pay with pt's [place] Medicaid.     Referral status:  1) [facility] (ph [phone])- referral received but not yet assessed  2) [facility] (ph [phone])- assessing    ANNIE Pittman, LICSW  6C   [facility]  Pager [phone]  Phone [phone]    Addendum 2:58pm: Spoke with admissions at [facility]- they have submitted for insurance authorization. [place] requested updated COVID test prior to d/c- updated team. Updated pt at bedside and spouse via phone on insurance auth pending at [facility].

## 2021-01-11 NOTE — PLAN OF CARE
Admitted 1/2 from [facility] post-STEMI s/p PCI to LAD. C/b hypoxic respiratory failure 2/2 to pulmonary edema, LLE weakness with CVA, sustained VT s/p ICD. Hx of type 2 diabetes with proliferative DR, HTN, vertigo, and osteomyelitis.    VSS, AO X 4, refusing bed bath and repositioning except when he specifically requests, continuing to offer. Skin red blanchable with foam dressing to coccyx. Carmen-cares and catheter cares performed. Sinus rhythm with intermittent pacing and PVC's. Torsemide adjusted today. Denies pain. Weakness LLE, active ROM performed with dorsiflexion/plantar flexion. Bowel sounds active, passing flatus. Last BM 1/10. Intermittent nausea, especially with cares and repositioning. Zafar in place for retention.    Continuing to monitor during SW discharge planning, referrals sent to find TCU placement.

## 2021-01-11 NOTE — PLAN OF CARE
OT:  Hold, per discussion with PT. OT will hold at this time to further assess needed and improve tolerance of therapy.  Will hold until patient is appropriate.

## 2021-01-11 NOTE — PROGRESS NOTES
CLINICAL NUTRITION SERVICES - ASSESSMENT NOTE     Nutrition Prescription    RECOMMENDATIONS FOR MDs/PROVIDERS TO ORDER:  --Recommend ordering a MVI with minerals supplement to ensure micronutrient needs are being met, as pt meets criteria for severe malnutrition.     Malnutrition Status:    Severe malnutrition in the context of acute on chronic illness.    Recommendations already ordered by Registered Dietitian (RD):  -PRN snacks/supplements    Future/Additional Recommendations:  -Continue current diet and fluid restrict per provider.   -Monitor blood glucose control.   --Monitor PO intake and need for scheduled supplements vs more aggressive nutrition interventions (re: enteral nutrition)     REASON FOR ASSESSMENT  Willy Harrell is a/an 60 year old male assessed by the dietitian for LOS    NUTRITION HISTORY  -Patient not known to this service PTA.   -No pertinent nutrition related hx in H&P  - Per RN note, pt has hx of vertigo.   -Per patient recall: has n/v related to vertigo only, has not noticed any recent weight changes PTA, was following a low sugar diet at home. Pt also states he was having Boost supplements at last hospital stay in [place], but does not want them here.     CURRENT NUTRITION ORDERS  Diet: 2 gram sodium, 1800 mL fluid restrict   Intake/Tolerance: Consuming % of meals per flowsheet record. This is consistent with patient recall.   -Pt states he does not really like the food here. Has been ordering 3 meals/day, limited or no snacks due to not being very hungry.   -Per RN note on 01/10: pt had short bout of nausea around 1600. Nausea intermittent.  -RN note on 01/08: pt c/o nausea/vomiting with any movement. Poor appetite.    LABS  Labs reviewed  -BG trending high (-201 over past 24 hrs), A1c 6.1 on 01/03/2021  Cr 1.59 (H)    MEDICATIONS  Medications reviewed  -receiving medium sliding scale insulin  Digoxin  Miralax  Senna-docusate BID  Torsemide    ANTHROPOMETRICS  Height: 193 cm (6'  "4\")  Most Recent Weight: 72.1 kg (159 lb)    IBW: 91.8 kg  BMI: Normal BMI  Weight History:   01/11/21 72.1 kg (159 lb)   01/10/21  74.9 kg (165 lb 3.2 oz)   01/09/21  73.9 kg (163 lb)   01/08/21  73.5 kg (162 lb)   01/07/21  73.9 kg (163 lb)   01/05/21  75.1 kg (165 lb 9.1 oz)   01/04/21  74.4 kg (164 lb)   01/03/21  74.4 kg (164 lb)   01/03/21  74.8 kg (165 lb)     Per Care Everywhere:   77.1 kg (169 lb) on 11/23/2020 = 6.5% wt loss x 2 months (not clinically significant)  75.3 kg on 11/18/2019  -Pt states a UBW of ~170 lb.     Dosing Weight: 72 kg (based on lowest admission weight of 72.1 kg on 01/11).    ASSESSED NUTRITION NEEDS  Estimated Energy Needs: 6492-5173 kcals/day (30 - 35 kcals/kg)  Justification: Repletion  Estimated Protein Needs: 86- 108 grams protein/day (1.2 - 1.5 grams of pro/kg)  Justification: Repletion and increased needs  Estimated Fluid Needs: 1800 mL/day (on fluid restriction)   Justification: Per provider pending fluid status    PHYSICAL FINDINGS  See malnutrition section below.  - On1 L O2   - Last BM on 01/07.    MALNUTRITION  % Intake: Decreased intake does not meet criteria  % Weight Loss: Weight loss does not meet criteria  Subcutaneous Fat Loss: Facial region: Severe, unable to due full assessment due to patient's severe vertigo with movement/repositioning.   Muscle Loss: Temporal:  Severe, Facial & jaw region: Severe and Thoracic region (clavicle, acromium bone, deltoid, trapezius, pectoral):  Severe, unable to due full assessment due to patient's severe vertigo with movement/repositioning.   Fluid Accumulation/Edema: Trace  Malnutrition Diagnosis: Severe malnutrition in the context of acute on chronic illness.     NUTRITION DIAGNOSIS  Malnutrition related to increased protein and energy needs related to acute on chronic illness as evidenced by severe fat loss in the facial region and severe muscle loss in the temporal, facial, and thoracic regions.  "     INTERVENTIONS  Implementation  Nutrition Education: discussed supplement options available.  Medical food supplement therapy - Supplements PRN     Goals  Patient to consume % of nutritionally adequate meal trays TID, or the equivalent with supplements/snacks.     Monitoring/Evaluation  Progress toward goals will be monitored and evaluated per protocol.    Manoj Colon   Dietetic Intern     I have read and agree with the above documentation.  Tamela Nicolas, MS, RD, LD, [facility]  6C RD Pager: [phone]

## 2021-01-11 NOTE — UTILIZATION REVIEW
"    Admission Status; Secondary Review Determination         Under the authority of the Utilization Management Committee, the utilization review process indicated a secondary review on the above patient.  The review outcome is based on review of the medical records, discussions with staff, and applying clinical experience noted on the date of the review.        (x)      Inpatient Status Appropriate - This patient's medical care is consistent with medical management for inpatient care and reasonable inpatient medical practice.      () Observation Status Appropriate - This patient does not meet hospital inpatient criteria and is placed in observation status. If this patient's primary payer is Medicare and was admitted as an inpatient, Condition Code 44 should be used and patient status changed to \"observation\".   () Admission Status NOT Appropriate - This patient's medical care is not consistent with medical management for Inpatient or Observation Status.          RATIONALE FOR DETERMINATION     \"Willy Harrell is a 60 year old male admitted on 1/2/2021. He has PMhx of T2DM with proliferative DR, HTN, osteomyelitis who was transferred to [facility] from [facility] for cardiac intervention for late-presentation of anterior STEMI (12/22/2020) now s/p PCI to LAD/Diag. Initially in cardiogenic shock secondary to ICM with HFrEF, weaned off IABP and pressors by 12/23. Hospital course complicated by hypoxic respiratory failure secondary to pulmonary edema, LLE weakness with scattered acute/subacute ischemic lesion on MRI 12/27, sustained VT s/p ICD 12/31. Transferred to [facility] (1/2/2021) for further management and possible advanced heart failure therapy evaluation.\"    Pt required IV lasix with close monitoring of renal function. Given his complicated cardiac history he was transferred to [facility] for advanced heart failure therapy. He underwent RHC 1/5 to aid with fluid management. Given need for CHRISTOPHE, RHC and IV lasix " along with close monitoring, the patient has stayed > 2 MN and inpatient status is appropriate.      The severity of illness, intensity of service provided, expected LOS and risk for adverse outcome make the care complex, high risk and appropriate for hospital admission.        The information on this document is developed by the utilization review team in order for the business office to ensure compliance.  This only denotes the appropriateness of proper admission status and does not reflect the quality of care rendered.         The definitions of Inpatient Status and Observation Status used in making the determination above are those provided in the CMS Coverage Manual, Chapter 1 and Chapter 6, section 70.4.      Sincerely,     AYAZ AYERS MD    Physician Advisor  Utilization Review/ Case Management  [facility].

## 2021-01-11 NOTE — PLAN OF CARE
D: Pt admit 1/2/21 from [facility] for further management/possible advanced HF therapy. Pt had STEMI(12/22/20) s/p PCI, pt initially in cardiogenic shock 2/2 ICM w/HFrEF. Course c/b hypoxic respiratory failure 2/2 pulmonary edema, LLE weakness (ischemic lesions on MRI 12/27), sustained VT s/p ICD 12/31. PMH DM2, HTN, osteomyelitis    I/A:   Neuro: A&Ox4. Flat affect.   VS: VSS. RA/ 1L NC overnight  Tele: SR/ rarely paced  Pain: denies  GI/: Urinating adequately into armenta. Smear BM on bedpan. Pt has positional vertigo causing dry heaving/nausea with repositioning. Pt refusing antiemetics.   Diet: 2g Na w/1.8L FR  BG/insulin: ACHS BG checks  IV/Drips: R and L PIV SL  Activity: Lift dependent/ Ax2.   Skin/drains: Pt bottom red blanchable per NST, mepilex in place. pt frequently refused repositioning assistance from staff despite encouragement/teaching    P: Plan for discharge to TCU pending placement, encourage activity, reposition as tolerated. Continue to monitor pt status and report changes to Cards 2.     Eileen Myles RN

## 2021-01-12 VITALS
TEMPERATURE: 97.5 F | OXYGEN SATURATION: 95 % | SYSTOLIC BLOOD PRESSURE: 108 MMHG | HEIGHT: 76 IN | HEART RATE: 74 BPM | BODY MASS INDEX: 19 KG/M2 | DIASTOLIC BLOOD PRESSURE: 75 MMHG | WEIGHT: 156 LBS | RESPIRATION RATE: 18 BRPM

## 2021-01-12 LAB
ALBUMIN SERPL-MCNC: 2.4 G/DL (ref 3.4–5)
ALP SERPL-CCNC: 89 U/L (ref 40–150)
ALT SERPL W P-5'-P-CCNC: 44 U/L (ref 0–70)
ANION GAP SERPL CALCULATED.3IONS-SCNC: 7 MMOL/L (ref 3–14)
AST SERPL W P-5'-P-CCNC: 35 U/L (ref 0–45)
BILIRUB DIRECT SERPL-MCNC: 0.2 MG/DL (ref 0–0.2)
BILIRUB SERPL-MCNC: 1.1 MG/DL (ref 0.2–1.3)
BUN SERPL-MCNC: 33 MG/DL (ref 7–30)
CALCIUM SERPL-MCNC: 8.6 MG/DL (ref 8.5–10.1)
CHLORIDE SERPL-SCNC: 100 MMOL/L (ref 94–109)
CO2 SERPL-SCNC: 32 MMOL/L (ref 20–32)
CREAT SERPL-MCNC: 1.52 MG/DL (ref 0.66–1.25)
GFR SERPL CREATININE-BSD FRML MDRD: 49 ML/MIN/{1.73_M2}
GLUCOSE BLDC GLUCOMTR-MCNC: 158 MG/DL (ref 70–99)
GLUCOSE SERPL-MCNC: 120 MG/DL (ref 70–99)
INR PPP: 1.3 (ref 0.86–1.14)
POTASSIUM SERPL-SCNC: 3.6 MMOL/L (ref 3.4–5.3)
PROT SERPL-MCNC: 5.7 G/DL (ref 6.8–8.8)
SODIUM SERPL-SCNC: 139 MMOL/L (ref 133–144)

## 2021-01-12 PROCEDURE — 999N001017 HC STATISTIC GLUCOSE BY METER IP

## 2021-01-12 PROCEDURE — 250N000013 HC RX MED GY IP 250 OP 250 PS 637: Performed by: STUDENT IN AN ORGANIZED HEALTH CARE EDUCATION/TRAINING PROGRAM

## 2021-01-12 PROCEDURE — 36415 COLL VENOUS BLD VENIPUNCTURE: CPT | Performed by: INTERNAL MEDICINE

## 2021-01-12 PROCEDURE — 85610 PROTHROMBIN TIME: CPT | Performed by: INTERNAL MEDICINE

## 2021-01-12 PROCEDURE — 80076 HEPATIC FUNCTION PANEL: CPT | Performed by: INTERNAL MEDICINE

## 2021-01-12 PROCEDURE — 99239 HOSP IP/OBS DSCHRG MGMT >30: CPT | Mod: GC | Performed by: INTERNAL MEDICINE

## 2021-01-12 PROCEDURE — 250N000011 HC RX IP 250 OP 636: Performed by: STUDENT IN AN ORGANIZED HEALTH CARE EDUCATION/TRAINING PROGRAM

## 2021-01-12 PROCEDURE — 80048 BASIC METABOLIC PNL TOTAL CA: CPT | Performed by: INTERNAL MEDICINE

## 2021-01-12 RX ORDER — TORSEMIDE 20 MG/1
40 TABLET ORAL EVERY MORNING
DISCHARGE
Start: 2021-01-12

## 2021-01-12 RX ORDER — MULTIPLE VITAMINS W/ MINERALS TAB 9MG-400MCG
1 TAB ORAL DAILY
DISCHARGE
Start: 2021-01-13

## 2021-01-12 RX ORDER — MULTIPLE VITAMINS W/ MINERALS TAB 9MG-400MCG
1 TAB ORAL DAILY
Status: DISCONTINUED | OUTPATIENT
Start: 2021-01-12 | End: 2021-01-12 | Stop reason: HOSPADM

## 2021-01-12 RX ORDER — WARFARIN SODIUM 5 MG/1
5 TABLET ORAL EVERY EVENING
DISCHARGE
Start: 2021-01-12

## 2021-01-12 RX ORDER — ACETAMINOPHEN 325 MG/1
650 TABLET ORAL EVERY 4 HOURS PRN
DISCHARGE
Start: 2021-01-12

## 2021-01-12 RX ORDER — TORSEMIDE 20 MG/1
20 TABLET ORAL EVERY EVENING
DISCHARGE
Start: 2021-01-12

## 2021-01-12 RX ORDER — WARFARIN SODIUM 5 MG/1
5 TABLET ORAL
Status: DISCONTINUED | OUTPATIENT
Start: 2021-01-12 | End: 2021-01-12 | Stop reason: HOSPADM

## 2021-01-12 RX ORDER — MECLIZINE HCL 12.5 MG 12.5 MG/1
12.5 TABLET ORAL 3 TIMES DAILY PRN
DISCHARGE
Start: 2021-01-12

## 2021-01-12 RX ORDER — DIGOXIN 125 MCG
125 TABLET ORAL DAILY
DISCHARGE
Start: 2021-01-13

## 2021-01-12 RX ORDER — DIPHENHYDRAMINE HYDROCHLORIDE 50 MG/ML
12.5 INJECTION INTRAMUSCULAR; INTRAVENOUS ONCE
Status: COMPLETED | OUTPATIENT
Start: 2021-01-12 | End: 2021-01-12

## 2021-01-12 RX ADMIN — TICAGRELOR 90 MG: 90 TABLET ORAL at 08:39

## 2021-01-12 RX ADMIN — TORSEMIDE 40 MG: 20 TABLET ORAL at 08:38

## 2021-01-12 RX ADMIN — CITALOPRAM HYDROBROMIDE 20 MG: 20 TABLET ORAL at 08:38

## 2021-01-12 RX ADMIN — MULTIPLE VITAMINS W/ MINERALS TAB 1 TABLET: TAB at 08:38

## 2021-01-12 RX ADMIN — LANSOPRAZOLE 30 MG: 30 TABLET, ORALLY DISINTEGRATING, DELAYED RELEASE ORAL at 08:38

## 2021-01-12 RX ADMIN — DIGOXIN 125 MCG: 125 TABLET ORAL at 08:38

## 2021-01-12 RX ADMIN — ASPIRIN 81 MG CHEWABLE TABLET 81 MG: 81 TABLET CHEWABLE at 08:38

## 2021-01-12 RX ADMIN — DIPHENHYDRAMINE HYDROCHLORIDE 12.5 MG: 50 INJECTION, SOLUTION INTRAMUSCULAR; INTRAVENOUS at 10:40

## 2021-01-12 ASSESSMENT — ACTIVITIES OF DAILY LIVING (ADL)
ADLS_ACUITY_SCORE: 22
ADLS_ACUITY_SCORE: 21
ADLS_ACUITY_SCORE: 21
ADLS_ACUITY_SCORE: 22

## 2021-01-12 ASSESSMENT — MIFFLIN-ST. JEOR: SCORE: 1614.11

## 2021-01-12 NOTE — PROGRESS NOTES
Pt discharged to: TCU  Via: [facility] transportation     Appropriate LDA's removed from pt, telemetry discontinued. All belonging sent with patient and patient verifies taking all belongings with them. Patient exhibits understanding of discharge instruction and all questions and/or concerns were answered.     Yumiko Dove RN

## 2021-01-12 NOTE — PLAN OF CARE
Pt on 4A  Shift 0214-0050  D/I/A     Significant events: Continues to refuse cares.   Neuro: A&O X4.   CV: SR with intermittent pacing, BP stable  Pulm: 1L NC  GI: intermittent nausea, no BM overnight.   : Zafar, adequate output  Skin: sacral redness and heel blister noted  Access: PIV X2 SL        Plan: awaiting TCU placement. Continue to Monitor, update MD with changes and concerns.   See flowsheets for additional documentation and interventions.

## 2021-01-12 NOTE — DISCHARGE INSTRUCTIONS
Prior to discharge from TCU pt will need outpatient INR and warfarin monitoring arranged through his PCP: Indication for warfarin: LV thrombus, therapy Goal: INR 2-3

## 2021-01-12 NOTE — PLAN OF CARE
Physical Therapy Discharge Summary    Reason for therapy discharge:    Discharged to transitional care facility.    Progress towards therapy goal(s). See goals on Care Plan in [facility] electronic health record for goal details.  Goals partially met.  Barriers to achieving goals:   discharge from facility.    Therapy recommendation(s):    Continued therapy is recommended.  Rationale/Recommendations:  TCU.

## 2021-01-12 NOTE — PLAN OF CARE
Pt alert and oriented x4. Pt sinus rhythm with intermittent pacing, HRs 60-80s. On 1L O2 via NC with sats >92%. Pt denies any pain this evening. Appetite good. BG this shift 185 and 153, sliding scale insulin given 1x. Pt continues to report intermittent nausea and dizziness with activity/repositioning. Repositioning/turning offered and encouraged q2hrs but pt intermittently declines due to symptoms. Mepilex on coccyx, changed with daniel cares. Armenta catheter in place and draining urine, armenta cath cares also done. Asymptomatic COVID swab obtained and sent down to lab in preparation for discharge, result negative.     PLAN: Pt medically ready for discharge, waiting on TCU placement. Continue to monitor and assess pt with every encounter. Notify Cards 2 with any changes or concerns.

## 2021-01-12 NOTE — PROGRESS NOTES
Care Management Discharge Note    Discharge Date: 01/12/21  Expected Time of Departure: 1500 via [facility] Transportation (Ph: [phone]) - stretcher d/t O2 and weakness/deconditioning    Discharge Disposition: Transitional Care: [facility]CU  [address] [place] [id number]  Ph: [phone], F: [phone]    Private pay costs discussed: transportation costs    PAS Confirmation Code:  N/A - Not needed for [place] TCU  Patient/family educated on Medicare website which has current facility and service quality ratings: yes    Education Provided on the Discharge Plan:  yes  Persons Notified of Discharge Plans: Patient, Pt's wife (Jaky), Cards 2 Team, [facility], 6C Nursing Staff  Patient/Family in Agreement with the Plan: yes    Handoff Referral Completed: Yes    Staff Discharge Instructions:  SW faxed discharge orders.  Please print a packet and send with patient.     ANNIE Chase, LICSW  6B Intermediate Care Unit   [facility]  Phone: [phone]  Pager: [phone]

## 2021-01-12 NOTE — PLAN OF CARE
Transferred from outside hospital for cardiac intervention for late-presentation of anterior STEMI (12/22/20) now s/p PCI to LAD/Diag. Here for advanced HF therapies. Pmhx: T2DM, HTN, osteomyelitis     Code status: Full     Team: cards 3  Changed: One time order of IV benadryl for anxiety of armenta removal     Neuro: ao*4, L eye blind, refuses turns  Cardiac/Tele:  SR w/ occasional pacing, +1 LE edema, LE cool to touch,   Respiratory: RA sating in mid 90s (weaned off O2 today)  GI/: intermittent nausea (pt refused antimetic), Armenta removed pt did not tolerate it well due to anxiety and previous encounters with armenta, LBM 1/11  Diet/Appetite: 2 g na diet w/ 1.8 FR  Skin: Upper L chest pacemaker site swelling and edema,   Endocrine: ACHS  LDAs: 2 PIV SL  Activity: assist of 2 w/ lift team (bedfest)  Pain: denies      Plan: discharge today to U @ 1500     Yumiko Dove, RN  Time cared for 0700 - 1530

## 2021-01-14 ENCOUNTER — RECORDS - HEALTHEAST (OUTPATIENT)
Dept: LAB | Facility: CLINIC | Age: 61
End: 2021-01-14

## 2021-01-14 LAB
25(OH)D3 SERPL-MCNC: 27.2 NG/ML (ref 30–80)
ANION GAP SERPL CALCULATED.3IONS-SCNC: 10 MMOL/L (ref 5–18)
BASOPHILS # BLD AUTO: 0.1 THOU/UL (ref 0–0.2)
BASOPHILS NFR BLD AUTO: 1 % (ref 0–2)
BUN SERPL-MCNC: 37 MG/DL (ref 8–22)
CALCIUM SERPL-MCNC: 9 MG/DL (ref 8.5–10.5)
CHLORIDE BLD-SCNC: 98 MMOL/L (ref 98–107)
CO2 SERPL-SCNC: 31 MMOL/L (ref 22–31)
CREAT SERPL-MCNC: 1.71 MG/DL (ref 0.7–1.3)
EOSINOPHIL # BLD AUTO: 0.3 THOU/UL (ref 0–0.4)
EOSINOPHIL NFR BLD AUTO: 4 % (ref 0–6)
ERYTHROCYTE [DISTWIDTH] IN BLOOD BY AUTOMATED COUNT: 14.2 % (ref 11–14.5)
GFR SERPL CREATININE-BSD FRML MDRD: 41 ML/MIN/1.73M2
GLUCOSE BLD-MCNC: 137 MG/DL (ref 70–125)
HCT VFR BLD AUTO: 36.7 % (ref 40–54)
HGB BLD-MCNC: 12 G/DL (ref 14–18)
IMM GRANULOCYTES # BLD: 0 THOU/UL
IMM GRANULOCYTES NFR BLD: 1 %
INR PPP: 1.6 (ref 0.9–1.1)
LYMPHOCYTES # BLD AUTO: 0.9 THOU/UL (ref 0.8–4.4)
LYMPHOCYTES NFR BLD AUTO: 11 % (ref 20–40)
MCH RBC QN AUTO: 31.8 PG (ref 27–34)
MCHC RBC AUTO-ENTMCNC: 32.7 G/DL (ref 32–36)
MCV RBC AUTO: 97 FL (ref 80–100)
MONOCYTES # BLD AUTO: 0.8 THOU/UL (ref 0–0.9)
MONOCYTES NFR BLD AUTO: 10 % (ref 2–10)
NEUTROPHILS # BLD AUTO: 5.8 THOU/UL (ref 2–7.7)
NEUTROPHILS NFR BLD AUTO: 74 % (ref 50–70)
PLATELET # BLD AUTO: 224 THOU/UL (ref 140–440)
PMV BLD AUTO: 12 FL (ref 8.5–12.5)
POTASSIUM BLD-SCNC: 3.8 MMOL/L (ref 3.5–5)
RBC # BLD AUTO: 3.77 MILL/UL (ref 4.4–6.2)
SODIUM SERPL-SCNC: 139 MMOL/L (ref 136–145)
WBC: 7.8 THOU/UL (ref 4–11)

## 2021-01-18 ENCOUNTER — RECORDS - HEALTHEAST (OUTPATIENT)
Dept: LAB | Facility: CLINIC | Age: 61
End: 2021-01-18

## 2021-01-18 LAB
ANION GAP SERPL CALCULATED.3IONS-SCNC: 10 MMOL/L (ref 5–18)
BUN SERPL-MCNC: 41 MG/DL (ref 8–22)
CALCIUM SERPL-MCNC: 9 MG/DL (ref 8.5–10.5)
CHLORIDE BLD-SCNC: 98 MMOL/L (ref 98–107)
CO2 SERPL-SCNC: 29 MMOL/L (ref 22–31)
CREAT SERPL-MCNC: 1.67 MG/DL (ref 0.7–1.3)
DIGOXIN LEVEL LHE- HISTORICAL: 0.8 NG/ML (ref 0.5–2)
ERYTHROCYTE [DISTWIDTH] IN BLOOD BY AUTOMATED COUNT: 13.5 % (ref 11–14.5)
GFR SERPL CREATININE-BSD FRML MDRD: 42 ML/MIN/1.73M2
GLUCOSE BLD-MCNC: 182 MG/DL (ref 70–125)
HCT VFR BLD AUTO: 36.3 % (ref 40–54)
HGB BLD-MCNC: 11.9 G/DL (ref 14–18)
INR PPP: 1.93 (ref 0.9–1.1)
MCH RBC QN AUTO: 32.1 PG (ref 27–34)
MCHC RBC AUTO-ENTMCNC: 32.8 G/DL (ref 32–36)
MCV RBC AUTO: 98 FL (ref 80–100)
PLATELET # BLD AUTO: 257 THOU/UL (ref 140–440)
PMV BLD AUTO: 12.6 FL (ref 8.5–12.5)
POTASSIUM BLD-SCNC: 4.2 MMOL/L (ref 3.5–5)
RBC # BLD AUTO: 3.71 MILL/UL (ref 4.4–6.2)
SODIUM SERPL-SCNC: 137 MMOL/L (ref 136–145)
WBC: 8.3 THOU/UL (ref 4–11)

## 2021-01-26 ENCOUNTER — RECORDS - HEALTHEAST (OUTPATIENT)
Dept: LAB | Facility: CLINIC | Age: 61
End: 2021-01-26

## 2021-01-28 LAB
ALBUMIN SERPL-MCNC: 3.3 G/DL (ref 3.5–5)
ALP SERPL-CCNC: 85 U/L (ref 45–120)
ALT SERPL W P-5'-P-CCNC: 42 U/L (ref 0–45)
ANION GAP SERPL CALCULATED.3IONS-SCNC: 7 MMOL/L (ref 5–18)
AST SERPL W P-5'-P-CCNC: 29 U/L (ref 0–40)
BILIRUB DIRECT SERPL-MCNC: 0.2 MG/DL
BILIRUB SERPL-MCNC: 0.6 MG/DL (ref 0–1)
BUN SERPL-MCNC: 42 MG/DL (ref 8–22)
CALCIUM SERPL-MCNC: 9.2 MG/DL (ref 8.5–10.5)
CHLORIDE BLD-SCNC: 100 MMOL/L (ref 98–107)
CO2 SERPL-SCNC: 31 MMOL/L (ref 22–31)
CREAT SERPL-MCNC: 1.55 MG/DL (ref 0.7–1.3)
ERYTHROCYTE [DISTWIDTH] IN BLOOD BY AUTOMATED COUNT: 12.9 % (ref 11–14.5)
GFR SERPL CREATININE-BSD FRML MDRD: 46 ML/MIN/1.73M2
GLUCOSE BLD-MCNC: 117 MG/DL (ref 70–125)
HCT VFR BLD AUTO: 38.6 % (ref 40–54)
HGB BLD-MCNC: 12.5 G/DL (ref 14–18)
MCH RBC QN AUTO: 31.3 PG (ref 27–34)
MCHC RBC AUTO-ENTMCNC: 32.4 G/DL (ref 32–36)
MCV RBC AUTO: 97 FL (ref 80–100)
PLATELET # BLD AUTO: 241 THOU/UL (ref 140–440)
PMV BLD AUTO: 11.7 FL (ref 8.5–12.5)
POTASSIUM BLD-SCNC: 4.1 MMOL/L (ref 3.5–5)
PROT SERPL-MCNC: 6.9 G/DL (ref 6–8)
RBC # BLD AUTO: 3.99 MILL/UL (ref 4.4–6.2)
SODIUM SERPL-SCNC: 138 MMOL/L (ref 136–145)
WBC: 7.8 THOU/UL (ref 4–11)

## 2021-02-04 ENCOUNTER — RECORDS - HEALTHEAST (OUTPATIENT)
Dept: LAB | Facility: CLINIC | Age: 61
End: 2021-02-04

## 2021-02-04 LAB
ANION GAP SERPL CALCULATED.3IONS-SCNC: 7 MMOL/L (ref 5–18)
BUN SERPL-MCNC: 41 MG/DL (ref 8–22)
CALCIUM SERPL-MCNC: 9.2 MG/DL (ref 8.5–10.5)
CHLORIDE BLD-SCNC: 99 MMOL/L (ref 98–107)
CO2 SERPL-SCNC: 31 MMOL/L (ref 22–31)
CREAT SERPL-MCNC: 1.51 MG/DL (ref 0.7–1.3)
ERYTHROCYTE [DISTWIDTH] IN BLOOD BY AUTOMATED COUNT: 12.6 % (ref 11–14.5)
GFR SERPL CREATININE-BSD FRML MDRD: 47 ML/MIN/1.73M2
GLUCOSE BLD-MCNC: 177 MG/DL (ref 70–125)
HCT VFR BLD AUTO: 36.2 % (ref 40–54)
HGB BLD-MCNC: 11.8 G/DL (ref 14–18)
MCH RBC QN AUTO: 31.5 PG (ref 27–34)
MCHC RBC AUTO-ENTMCNC: 32.6 G/DL (ref 32–36)
MCV RBC AUTO: 97 FL (ref 80–100)
PLATELET # BLD AUTO: 224 THOU/UL (ref 140–440)
PMV BLD AUTO: 11.8 FL (ref 8.5–12.5)
POTASSIUM BLD-SCNC: 4.5 MMOL/L (ref 3.5–5)
RBC # BLD AUTO: 3.75 MILL/UL (ref 4.4–6.2)
SODIUM SERPL-SCNC: 137 MMOL/L (ref 136–145)
WBC: 8.3 THOU/UL (ref 4–11)

## 2021-03-18 ENCOUNTER — RECORDS - HEALTHEAST (OUTPATIENT)
Dept: LAB | Facility: CLINIC | Age: 61
End: 2021-03-18

## 2021-03-18 LAB
ALBUMIN SERPL-MCNC: 3 G/DL (ref 3.5–5)
ALP SERPL-CCNC: 68 U/L (ref 45–120)
ALT SERPL W P-5'-P-CCNC: 14 U/L (ref 0–45)
ANION GAP SERPL CALCULATED.3IONS-SCNC: 10 MMOL/L (ref 5–18)
AST SERPL W P-5'-P-CCNC: 12 U/L (ref 0–40)
BASOPHILS # BLD AUTO: 0.1 THOU/UL (ref 0–0.2)
BASOPHILS NFR BLD AUTO: 1 % (ref 0–2)
BILIRUB SERPL-MCNC: 0.8 MG/DL (ref 0–1)
BUN SERPL-MCNC: 32 MG/DL (ref 8–22)
CALCIUM SERPL-MCNC: 8.7 MG/DL (ref 8.5–10.5)
CHLORIDE BLD-SCNC: 106 MMOL/L (ref 98–107)
CO2 SERPL-SCNC: 26 MMOL/L (ref 22–31)
CREAT SERPL-MCNC: 1.53 MG/DL (ref 0.7–1.3)
EOSINOPHIL # BLD AUTO: 0.2 THOU/UL (ref 0–0.4)
EOSINOPHIL NFR BLD AUTO: 2 % (ref 0–6)
ERYTHROCYTE [DISTWIDTH] IN BLOOD BY AUTOMATED COUNT: 13.7 % (ref 11–14.5)
GFR SERPL CREATININE-BSD FRML MDRD: 47 ML/MIN/1.73M2
GLUCOSE BLD-MCNC: 109 MG/DL (ref 70–125)
HCT VFR BLD AUTO: 36.4 % (ref 40–54)
HGB BLD-MCNC: 11.7 G/DL (ref 14–18)
IMM GRANULOCYTES # BLD: 0.1 THOU/UL
IMM GRANULOCYTES NFR BLD: 1 %
LYMPHOCYTES # BLD AUTO: 1.2 THOU/UL (ref 0.8–4.4)
LYMPHOCYTES NFR BLD AUTO: 13 % (ref 20–40)
MCH RBC QN AUTO: 31 PG (ref 27–34)
MCHC RBC AUTO-ENTMCNC: 32.1 G/DL (ref 32–36)
MCV RBC AUTO: 97 FL (ref 80–100)
MONOCYTES # BLD AUTO: 0.9 THOU/UL (ref 0–0.9)
MONOCYTES NFR BLD AUTO: 10 % (ref 2–10)
NEUTROPHILS # BLD AUTO: 6.3 THOU/UL (ref 2–7.7)
NEUTROPHILS NFR BLD AUTO: 72 % (ref 50–70)
PLATELET # BLD AUTO: 273 THOU/UL (ref 140–440)
PMV BLD AUTO: 11.9 FL (ref 8.5–12.5)
POTASSIUM BLD-SCNC: 4.1 MMOL/L (ref 3.5–5)
PROT SERPL-MCNC: 6.1 G/DL (ref 6–8)
RBC # BLD AUTO: 3.77 MILL/UL (ref 4.4–6.2)
SODIUM SERPL-SCNC: 142 MMOL/L (ref 136–145)
T4 TOTAL - HISTORICAL: 7 UG/DL (ref 4.5–13)
TSH SERPL DL<=0.005 MIU/L-ACNC: 4.18 UIU/ML (ref 0.3–5)
WBC: 8.7 THOU/UL (ref 4–11)

## 2021-05-19 ENCOUNTER — RECORDS - HEALTHEAST (OUTPATIENT)
Dept: LAB | Facility: CLINIC | Age: 61
End: 2021-05-19

## 2021-05-20 LAB
ALBUMIN SERPL-MCNC: 3.3 G/DL (ref 3.5–5)
ALP SERPL-CCNC: 115 U/L (ref 45–120)
ALT SERPL W P-5'-P-CCNC: 34 U/L (ref 0–45)
ANION GAP SERPL CALCULATED.3IONS-SCNC: 11 MMOL/L (ref 5–18)
AST SERPL W P-5'-P-CCNC: 18 U/L (ref 0–40)
BILIRUB SERPL-MCNC: 1.3 MG/DL (ref 0–1)
BUN SERPL-MCNC: 30 MG/DL (ref 8–22)
CALCIUM SERPL-MCNC: 8.6 MG/DL (ref 8.5–10.5)
CHLORIDE BLD-SCNC: 105 MMOL/L (ref 98–107)
CO2 SERPL-SCNC: 26 MMOL/L (ref 22–31)
CREAT SERPL-MCNC: 1.24 MG/DL (ref 0.7–1.3)
DIGOXIN LEVEL LHE- HISTORICAL: 1.1 NG/ML (ref 0.5–2)
GFR SERPL CREATININE-BSD FRML MDRD: 59 ML/MIN/1.73M2
GLUCOSE BLD-MCNC: 78 MG/DL (ref 70–125)
POTASSIUM BLD-SCNC: 4.4 MMOL/L (ref 3.5–5)
PROT SERPL-MCNC: 5.8 G/DL (ref 6–8)
SODIUM SERPL-SCNC: 142 MMOL/L (ref 136–145)

## 2021-07-13 ENCOUNTER — LAB REQUISITION (OUTPATIENT)
Dept: LAB | Facility: CLINIC | Age: 61
End: 2021-07-13

## 2021-07-13 DIAGNOSIS — I50.23 ACUTE ON CHRONIC SYSTOLIC (CONGESTIVE) HEART FAILURE (H): ICD-10-CM

## 2021-07-13 LAB
ANION GAP SERPL CALCULATED.3IONS-SCNC: 15 MMOL/L (ref 5–18)
BUN SERPL-MCNC: 58 MG/DL (ref 8–22)
CALCIUM SERPL-MCNC: 9.4 MG/DL (ref 8.5–10.5)
CHLORIDE BLD-SCNC: 102 MMOL/L (ref 98–107)
CO2 SERPL-SCNC: 20 MMOL/L (ref 22–31)
CREAT SERPL-MCNC: 1.83 MG/DL (ref 0.7–1.3)
GFR SERPL CREATININE-BSD FRML MDRD: 39 ML/MIN/1.73M2
GLUCOSE BLD-MCNC: 63 MG/DL (ref 70–125)
POTASSIUM BLD-SCNC: 4.4 MMOL/L (ref 3.5–5)
SODIUM SERPL-SCNC: 137 MMOL/L (ref 136–145)

## 2021-07-21 ENCOUNTER — LAB REQUISITION (OUTPATIENT)
Dept: LAB | Facility: CLINIC | Age: 61
End: 2021-07-21

## 2021-07-21 DIAGNOSIS — N18.9 CHRONIC KIDNEY DISEASE, UNSPECIFIED: ICD-10-CM

## 2021-07-22 LAB
ANION GAP SERPL CALCULATED.3IONS-SCNC: 11 MMOL/L (ref 5–18)
BUN SERPL-MCNC: 22 MG/DL (ref 8–22)
CALCIUM SERPL-MCNC: 8.8 MG/DL (ref 8.5–10.5)
CHLORIDE BLD-SCNC: 105 MMOL/L (ref 98–107)
CO2 SERPL-SCNC: 24 MMOL/L (ref 22–31)
CREAT SERPL-MCNC: 0.91 MG/DL (ref 0.7–1.3)
GFR SERPL CREATININE-BSD FRML MDRD: >90 ML/MIN/1.73M2
GLUCOSE BLD-MCNC: 56 MG/DL (ref 70–125)
POTASSIUM BLD-SCNC: 4.1 MMOL/L (ref 3.5–5)
SODIUM SERPL-SCNC: 140 MMOL/L (ref 136–145)

## 2021-07-22 PROCEDURE — 80048 BASIC METABOLIC PNL TOTAL CA: CPT | Mod: ORL | Performed by: FAMILY MEDICINE

## 2021-07-22 PROCEDURE — P9603 ONE-WAY ALLOW PRORATED MILES: HCPCS | Mod: ORL | Performed by: FAMILY MEDICINE

## 2021-07-22 PROCEDURE — 36415 COLL VENOUS BLD VENIPUNCTURE: CPT | Mod: ORL | Performed by: FAMILY MEDICINE

## 2021-09-23 ENCOUNTER — LAB REQUISITION (OUTPATIENT)
Dept: LAB | Facility: CLINIC | Age: 61
End: 2021-09-23
Payer: MEDICAID

## 2021-09-23 DIAGNOSIS — I50.23 ACUTE ON CHRONIC SYSTOLIC (CONGESTIVE) HEART FAILURE (H): ICD-10-CM

## 2021-09-23 LAB
ANION GAP SERPL CALCULATED.3IONS-SCNC: 11 MMOL/L (ref 5–18)
BNP SERPL-MCNC: 2538 PG/ML (ref 0–53)
BUN SERPL-MCNC: 31 MG/DL (ref 8–22)
CALCIUM SERPL-MCNC: 9.1 MG/DL (ref 8.5–10.5)
CHLORIDE BLD-SCNC: 105 MMOL/L (ref 98–107)
CO2 SERPL-SCNC: 23 MMOL/L (ref 22–31)
CREAT SERPL-MCNC: 1.17 MG/DL (ref 0.7–1.3)
GFR SERPL CREATININE-BSD FRML MDRD: 67 ML/MIN/1.73M2
GLUCOSE BLD-MCNC: 171 MG/DL (ref 70–125)
POTASSIUM BLD-SCNC: 4.8 MMOL/L (ref 3.5–5)
SODIUM SERPL-SCNC: 139 MMOL/L (ref 136–145)

## 2021-09-23 PROCEDURE — 83880 ASSAY OF NATRIURETIC PEPTIDE: CPT | Mod: ORL | Performed by: FAMILY MEDICINE

## 2021-09-23 PROCEDURE — 80048 BASIC METABOLIC PNL TOTAL CA: CPT | Mod: ORL | Performed by: FAMILY MEDICINE

## 2021-09-28 ENCOUNTER — LAB REQUISITION (OUTPATIENT)
Dept: LAB | Facility: CLINIC | Age: 61
End: 2021-09-28
Payer: MEDICAID

## 2021-09-28 DIAGNOSIS — I50.9 HEART FAILURE, UNSPECIFIED (H): ICD-10-CM

## 2021-09-29 PROCEDURE — 36415 COLL VENOUS BLD VENIPUNCTURE: CPT | Mod: ORL | Performed by: FAMILY MEDICINE

## 2021-09-29 PROCEDURE — P9603 ONE-WAY ALLOW PRORATED MILES: HCPCS | Mod: ORL | Performed by: FAMILY MEDICINE

## 2021-09-29 PROCEDURE — 83880 ASSAY OF NATRIURETIC PEPTIDE: CPT | Mod: ORL | Performed by: FAMILY MEDICINE

## 2021-09-29 PROCEDURE — 80048 BASIC METABOLIC PNL TOTAL CA: CPT | Mod: ORL | Performed by: FAMILY MEDICINE

## 2021-09-30 LAB
ANION GAP SERPL CALCULATED.3IONS-SCNC: 16 MMOL/L (ref 5–18)
BNP SERPL-MCNC: 1885 PG/ML (ref 0–53)
BUN SERPL-MCNC: 44 MG/DL (ref 8–22)
CALCIUM SERPL-MCNC: 8.9 MG/DL (ref 8.5–10.5)
CHLORIDE BLD-SCNC: 100 MMOL/L (ref 98–107)
CO2 SERPL-SCNC: 24 MMOL/L (ref 22–31)
CREAT SERPL-MCNC: 1.9 MG/DL (ref 0.7–1.3)
GFR SERPL CREATININE-BSD FRML MDRD: 37 ML/MIN/1.73M2
GLUCOSE BLD-MCNC: 66 MG/DL (ref 70–125)
POTASSIUM BLD-SCNC: 4.3 MMOL/L (ref 3.5–5)
SODIUM SERPL-SCNC: 140 MMOL/L (ref 136–145)

## 2021-10-06 ENCOUNTER — LAB REQUISITION (OUTPATIENT)
Dept: LAB | Facility: CLINIC | Age: 61
End: 2021-10-06

## 2021-10-06 DIAGNOSIS — I50.9 HEART FAILURE, UNSPECIFIED (H): ICD-10-CM

## 2021-10-13 ENCOUNTER — LAB REQUISITION (OUTPATIENT)
Dept: LAB | Facility: CLINIC | Age: 61
End: 2021-10-13
Payer: MEDICAID

## 2021-10-13 DIAGNOSIS — E11.59 TYPE 2 DIABETES MELLITUS WITH OTHER CIRCULATORY COMPLICATIONS (H): ICD-10-CM

## 2021-10-13 DIAGNOSIS — I50.23 ACUTE ON CHRONIC SYSTOLIC (CONGESTIVE) HEART FAILURE (H): ICD-10-CM

## 2021-10-14 LAB
ANION GAP SERPL CALCULATED.3IONS-SCNC: 7 MMOL/L (ref 5–18)
BNP SERPL-MCNC: 75 PG/ML (ref 0–53)
BUN SERPL-MCNC: 45 MG/DL (ref 8–22)
CALCIUM SERPL-MCNC: 8.7 MG/DL (ref 8.5–10.5)
CHLORIDE BLD-SCNC: 100 MMOL/L (ref 98–107)
CO2 SERPL-SCNC: 30 MMOL/L (ref 22–31)
CREAT SERPL-MCNC: 1.39 MG/DL (ref 0.7–1.3)
GFR SERPL CREATININE-BSD FRML MDRD: 54 ML/MIN/1.73M2
GLUCOSE BLD-MCNC: 91 MG/DL (ref 70–125)
HBA1C MFR BLD: 6.5 %
POTASSIUM BLD-SCNC: 4.3 MMOL/L (ref 3.5–5)
SODIUM SERPL-SCNC: 137 MMOL/L (ref 136–145)

## 2021-10-14 PROCEDURE — 36415 COLL VENOUS BLD VENIPUNCTURE: CPT | Mod: ORL | Performed by: FAMILY MEDICINE

## 2021-10-14 PROCEDURE — P9603 ONE-WAY ALLOW PRORATED MILES: HCPCS | Mod: ORL | Performed by: FAMILY MEDICINE

## 2021-10-14 PROCEDURE — 83880 ASSAY OF NATRIURETIC PEPTIDE: CPT | Mod: ORL | Performed by: FAMILY MEDICINE

## 2021-10-14 PROCEDURE — 83036 HEMOGLOBIN GLYCOSYLATED A1C: CPT | Mod: ORL | Performed by: FAMILY MEDICINE

## 2021-10-14 PROCEDURE — 80048 BASIC METABOLIC PNL TOTAL CA: CPT | Mod: ORL | Performed by: FAMILY MEDICINE

## 2021-11-03 ENCOUNTER — LAB REQUISITION (OUTPATIENT)
Dept: LAB | Facility: CLINIC | Age: 61
End: 2021-11-03
Payer: MEDICAID

## 2021-11-03 DIAGNOSIS — I50.23 ACUTE ON CHRONIC SYSTOLIC (CONGESTIVE) HEART FAILURE (H): ICD-10-CM

## 2021-11-04 LAB
ALBUMIN SERPL-MCNC: 2.4 G/DL (ref 3.5–5)
ALP SERPL-CCNC: 127 U/L (ref 45–120)
ALT SERPL W P-5'-P-CCNC: 19 U/L (ref 0–45)
ANION GAP SERPL CALCULATED.3IONS-SCNC: 10 MMOL/L (ref 5–18)
AST SERPL W P-5'-P-CCNC: 22 U/L (ref 0–40)
BILIRUB SERPL-MCNC: 1.5 MG/DL (ref 0–1)
BUN SERPL-MCNC: 40 MG/DL (ref 8–22)
CALCIUM SERPL-MCNC: 8.6 MG/DL (ref 8.5–10.5)
CHLORIDE BLD-SCNC: 102 MMOL/L (ref 98–107)
CO2 SERPL-SCNC: 24 MMOL/L (ref 22–31)
CREAT SERPL-MCNC: 1.41 MG/DL (ref 0.7–1.3)
GFR SERPL CREATININE-BSD FRML MDRD: 53 ML/MIN/1.73M2
GLUCOSE BLD-MCNC: 95 MG/DL (ref 70–125)
POTASSIUM BLD-SCNC: 4.6 MMOL/L (ref 3.5–5)
PROT SERPL-MCNC: 5.6 G/DL (ref 6–8)
SODIUM SERPL-SCNC: 136 MMOL/L (ref 136–145)

## 2021-11-04 PROCEDURE — 80053 COMPREHEN METABOLIC PANEL: CPT | Mod: ORL | Performed by: FAMILY MEDICINE

## 2021-11-04 PROCEDURE — P9603 ONE-WAY ALLOW PRORATED MILES: HCPCS | Mod: ORL | Performed by: FAMILY MEDICINE

## 2021-11-04 PROCEDURE — 36415 COLL VENOUS BLD VENIPUNCTURE: CPT | Mod: ORL | Performed by: FAMILY MEDICINE

## 2021-12-01 ENCOUNTER — LAB REQUISITION (OUTPATIENT)
Dept: LAB | Facility: CLINIC | Age: 61
End: 2021-12-01

## 2021-12-01 DIAGNOSIS — I50.9 HEART FAILURE, UNSPECIFIED (H): ICD-10-CM

## 2021-12-02 ENCOUNTER — LAB REQUISITION (OUTPATIENT)
Dept: LAB | Facility: CLINIC | Age: 61
End: 2021-12-02
Payer: MEDICAID

## 2021-12-02 DIAGNOSIS — I50.9 HEART FAILURE, UNSPECIFIED (H): ICD-10-CM

## 2021-12-02 LAB
ANION GAP SERPL CALCULATED.3IONS-SCNC: 12 MMOL/L (ref 5–18)
BNP SERPL-MCNC: 3476 PG/ML (ref 0–55)
BUN SERPL-MCNC: 33 MG/DL (ref 8–22)
CALCIUM SERPL-MCNC: 8.6 MG/DL (ref 8.5–10.5)
CHLORIDE BLD-SCNC: 99 MMOL/L (ref 98–107)
CO2 SERPL-SCNC: 28 MMOL/L (ref 22–31)
CREAT SERPL-MCNC: 1.13 MG/DL (ref 0.7–1.3)
GFR SERPL CREATININE-BSD FRML MDRD: 69 ML/MIN/1.73M2
GLUCOSE BLD-MCNC: 102 MG/DL (ref 70–125)
POTASSIUM BLD-SCNC: 3.2 MMOL/L (ref 3.5–5)
SODIUM SERPL-SCNC: 139 MMOL/L (ref 136–145)

## 2021-12-02 PROCEDURE — 80048 BASIC METABOLIC PNL TOTAL CA: CPT | Mod: ORL | Performed by: FAMILY MEDICINE

## 2021-12-02 PROCEDURE — 83880 ASSAY OF NATRIURETIC PEPTIDE: CPT | Mod: ORL | Performed by: FAMILY MEDICINE

## 2021-12-02 PROCEDURE — 36415 COLL VENOUS BLD VENIPUNCTURE: CPT | Mod: ORL | Performed by: FAMILY MEDICINE

## 2021-12-02 PROCEDURE — P9603 ONE-WAY ALLOW PRORATED MILES: HCPCS | Mod: ORL | Performed by: FAMILY MEDICINE

## 2021-12-03 ENCOUNTER — LAB REQUISITION (OUTPATIENT)
Dept: LAB | Facility: CLINIC | Age: 61
End: 2021-12-03
Payer: MEDICAID

## 2021-12-03 LAB
ANION GAP SERPL CALCULATED.3IONS-SCNC: 12 MMOL/L (ref 5–18)
BUN SERPL-MCNC: 34 MG/DL (ref 8–22)
CALCIUM SERPL-MCNC: 8.8 MG/DL (ref 8.5–10.5)
CHLORIDE BLD-SCNC: 101 MMOL/L (ref 98–107)
CO2 SERPL-SCNC: 26 MMOL/L (ref 22–31)
CREAT SERPL-MCNC: 1.04 MG/DL (ref 0.7–1.3)
GFR SERPL CREATININE-BSD FRML MDRD: 77 ML/MIN/1.73M2
GLUCOSE BLD-MCNC: 49 MG/DL (ref 70–125)
POTASSIUM BLD-SCNC: 4 MMOL/L (ref 3.5–5)
SODIUM SERPL-SCNC: 139 MMOL/L (ref 136–145)

## 2021-12-03 PROCEDURE — 80048 BASIC METABOLIC PNL TOTAL CA: CPT | Mod: ORL

## 2021-12-03 PROCEDURE — 36415 COLL VENOUS BLD VENIPUNCTURE: CPT | Mod: ORL

## (undated) DEVICE — KIT RIGHT HEART CATH 60130719

## (undated) DEVICE — INTRO SHEATH 7FRX10CM PINNACLE RSS702

## (undated) DEVICE — Device

## (undated) DEVICE — PACK HEART RIGHT CUSTOM SAN32RHF18

## (undated) RX ORDER — DEXAMETHASONE SODIUM PHOSPHATE 4 MG/ML
INJECTION, SOLUTION INTRA-ARTICULAR; INTRALESIONAL; INTRAMUSCULAR; INTRAVENOUS; SOFT TISSUE
Status: DISPENSED
Start: 2021-01-04

## (undated) RX ORDER — PROPOFOL 10 MG/ML
INJECTION, EMULSION INTRAVENOUS
Status: DISPENSED
Start: 2021-01-04

## (undated) RX ORDER — FENTANYL CITRATE 50 UG/ML
INJECTION, SOLUTION INTRAMUSCULAR; INTRAVENOUS
Status: DISPENSED
Start: 2021-01-04

## (undated) RX ORDER — ONDANSETRON 2 MG/ML
INJECTION INTRAMUSCULAR; INTRAVENOUS
Status: DISPENSED
Start: 2021-01-04

## (undated) RX ORDER — LIDOCAINE HYDROCHLORIDE 20 MG/ML
INJECTION, SOLUTION EPIDURAL; INFILTRATION; INTRACAUDAL; PERINEURAL
Status: DISPENSED
Start: 2021-01-04

## (undated) RX ORDER — LIDOCAINE HYDROCHLORIDE 20 MG/ML
SOLUTION OROPHARYNGEAL
Status: DISPENSED
Start: 2021-01-04

## (undated) RX ORDER — LORAZEPAM 2 MG/ML
INJECTION INTRAMUSCULAR
Status: DISPENSED
Start: 2021-01-04